# Patient Record
Sex: MALE | Race: WHITE | NOT HISPANIC OR LATINO
[De-identification: names, ages, dates, MRNs, and addresses within clinical notes are randomized per-mention and may not be internally consistent; named-entity substitution may affect disease eponyms.]

---

## 2022-01-01 ENCOUNTER — APPOINTMENT (OUTPATIENT)
Dept: UROLOGY | Facility: CLINIC | Age: 87
End: 2022-01-01

## 2022-01-20 NOTE — HISTORY OF PRESENT ILLNESS
[FreeTextEntry1] : Dear Dr. Santiago,\par \par \par Thank you so much for the referral to help care for your patient.\par \par \par Chief Complaint: Elevated PSA\par Date of first visit: 1/24/2022\par \par \par RAPHAEL JAMES  is a 89 year old ~race man with PMHx BPH s/p TURP, recurrent UTI, ED, hypogonadism, HTN, HLD, GERD who presents for elevated PSA. His PSA is >100 ng/ml. MRI on 12/14/21 demonstrated a PIRADS 5 lesion 3.8cm, bilateral mid gland involving most of the TZ and PZ with +EPE and SVI (L>R). He has never had a prostate biopsy and denies family hx of CaP.\par \par 11/17/21 Klebsiella UTI\par 12/22/21 UCx negative\par \par PSA Hx\par >100 11/17/21\par >100 10/21/1\par \par MRI Hx\par MRI at [facility] on 12/14/2021.  36cc prostate with PIRADS 5 lesion measuring 3.8cm, involving base and mid gland involving most of the transition and peripheral zones (series 8 image 8-19 on T2, series 5 image 8-16 on ADC). + EPE and SVI (L>R). No LAD, No Bony Lesions.  The images have been reviewed and clinical implications discussed with the patient.\par \par 01/24/2022\par IPSS [] QOL []\par SOURAV []\par \par  Prostate cancer screening: the patient and I spoke at length about prostate cancer screening, its risks and its benefits. The patient has 3 (age, PSA, MRI) risk factors for prostate cancer.  He understands that many men with prostate cancer will die with the disease rather than of it and we also discussed the results large multi-center American and  prostate cancer screening trials. He also understands that PSA in and of itself does not diagnose prostate cancer but only assesses risk to a certain degree. The patient understands that to definitively screen for prostate cancer, a biopsy is required and this procedure has risks, including bleeding, infection, ED and urinary retention. The patient opted to [] .\par \par The patient denies fevers, chills, nausea and or vomiting and no unexplained weight loss.\par \par All pertinent laboratory, films and physician notes were reviewed.  Questionnaire results were discussed with patient.

## 2023-01-01 ENCOUNTER — INPATIENT (INPATIENT)
Facility: HOSPITAL | Age: 88
LOS: 2 days | DRG: 193 | End: 2023-01-20
Attending: INTERNAL MEDICINE | Admitting: HOSPITALIST
Payer: MEDICARE

## 2023-01-01 VITALS
HEART RATE: 110 BPM | DIASTOLIC BLOOD PRESSURE: 62 MMHG | SYSTOLIC BLOOD PRESSURE: 103 MMHG | OXYGEN SATURATION: 95 % | WEIGHT: 190.04 LBS | RESPIRATION RATE: 18 BRPM

## 2023-01-01 VITALS — RESPIRATION RATE: 18 BRPM | OXYGEN SATURATION: 97 %

## 2023-01-01 DIAGNOSIS — R09.89 OTHER SPECIFIED SYMPTOMS AND SIGNS INVOLVING THE CIRCULATORY AND RESPIRATORY SYSTEMS: ICD-10-CM

## 2023-01-01 DIAGNOSIS — Z79.899 OTHER LONG TERM (CURRENT) DRUG THERAPY: ICD-10-CM

## 2023-01-01 DIAGNOSIS — Z51.5 ENCOUNTER FOR PALLIATIVE CARE: ICD-10-CM

## 2023-01-01 DIAGNOSIS — C34.90 MALIGNANT NEOPLASM OF UNSPECIFIED PART OF UNSPECIFIED BRONCHUS OR LUNG: ICD-10-CM

## 2023-01-01 DIAGNOSIS — I10 ESSENTIAL (PRIMARY) HYPERTENSION: ICD-10-CM

## 2023-01-01 DIAGNOSIS — R06.00 DYSPNEA, UNSPECIFIED: ICD-10-CM

## 2023-01-01 DIAGNOSIS — R52 PAIN, UNSPECIFIED: ICD-10-CM

## 2023-01-01 DIAGNOSIS — Z71.89 OTHER SPECIFIED COUNSELING: ICD-10-CM

## 2023-01-01 DIAGNOSIS — J18.9 PNEUMONIA, UNSPECIFIED ORGANISM: ICD-10-CM

## 2023-01-01 DIAGNOSIS — Z29.9 ENCOUNTER FOR PROPHYLACTIC MEASURES, UNSPECIFIED: ICD-10-CM

## 2023-01-01 DIAGNOSIS — F03.90 UNSPECIFIED DEMENTIA, UNSPECIFIED SEVERITY, WITHOUT BEHAVIORAL DISTURBANCE, PSYCHOTIC DISTURBANCE, MOOD DISTURBANCE, AND ANXIETY: ICD-10-CM

## 2023-01-01 DIAGNOSIS — J96.01 ACUTE RESPIRATORY FAILURE WITH HYPOXIA: ICD-10-CM

## 2023-01-01 LAB
ALBUMIN SERPL ELPH-MCNC: 2.9 G/DL — LOW (ref 3.3–5)
ALBUMIN SERPL ELPH-MCNC: 2.9 G/DL — LOW (ref 3.3–5)
ALBUMIN SERPL ELPH-MCNC: 3.3 G/DL — SIGNIFICANT CHANGE UP (ref 3.3–5)
ALP SERPL-CCNC: 48 U/L — SIGNIFICANT CHANGE UP (ref 40–120)
ALP SERPL-CCNC: 48 U/L — SIGNIFICANT CHANGE UP (ref 40–120)
ALP SERPL-CCNC: 54 U/L — SIGNIFICANT CHANGE UP (ref 40–120)
ALT FLD-CCNC: 8 U/L — LOW (ref 10–45)
ALT FLD-CCNC: 8 U/L — LOW (ref 10–45)
ALT FLD-CCNC: 9 U/L — LOW (ref 10–45)
ANION GAP SERPL CALC-SCNC: 12 MMOL/L — SIGNIFICANT CHANGE UP (ref 5–17)
ANION GAP SERPL CALC-SCNC: 12 MMOL/L — SIGNIFICANT CHANGE UP (ref 5–17)
ANION GAP SERPL CALC-SCNC: 13 MMOL/L — SIGNIFICANT CHANGE UP (ref 5–17)
ANION GAP SERPL CALC-SCNC: 18 MMOL/L — HIGH (ref 5–17)
APPEARANCE UR: ABNORMAL
APTT BLD: 36.5 SEC — HIGH (ref 27.5–35.5)
AST SERPL-CCNC: 12 U/L — SIGNIFICANT CHANGE UP (ref 10–40)
AST SERPL-CCNC: 9 U/L — LOW (ref 10–40)
AST SERPL-CCNC: 9 U/L — LOW (ref 10–40)
BACTERIA # UR AUTO: NEGATIVE — SIGNIFICANT CHANGE UP
BASE EXCESS BLDV CALC-SCNC: 0.8 MMOL/L — SIGNIFICANT CHANGE UP (ref -2–3)
BASOPHILS # BLD AUTO: 0.05 K/UL — SIGNIFICANT CHANGE UP (ref 0–0.2)
BASOPHILS NFR BLD AUTO: 0.7 % — SIGNIFICANT CHANGE UP (ref 0–2)
BILIRUB SERPL-MCNC: 0.2 MG/DL — SIGNIFICANT CHANGE UP (ref 0.2–1.2)
BILIRUB UR-MCNC: ABNORMAL
BUN SERPL-MCNC: 38 MG/DL — HIGH (ref 7–23)
BUN SERPL-MCNC: 38 MG/DL — HIGH (ref 7–23)
BUN SERPL-MCNC: 46 MG/DL — HIGH (ref 7–23)
BUN SERPL-MCNC: 54 MG/DL — HIGH (ref 7–23)
CA-I SERPL-SCNC: 1.35 MMOL/L — HIGH (ref 1.15–1.33)
CALCIUM SERPL-MCNC: 9.1 MG/DL — SIGNIFICANT CHANGE UP (ref 8.4–10.5)
CALCIUM SERPL-MCNC: 9.5 MG/DL — SIGNIFICANT CHANGE UP (ref 8.4–10.5)
CALCIUM SERPL-MCNC: 9.7 MG/DL — SIGNIFICANT CHANGE UP (ref 8.4–10.5)
CALCIUM SERPL-MCNC: 9.9 MG/DL — SIGNIFICANT CHANGE UP (ref 8.4–10.5)
CHLORIDE BLDV-SCNC: 107 MMOL/L — SIGNIFICANT CHANGE UP (ref 96–108)
CHLORIDE SERPL-SCNC: 107 MMOL/L — SIGNIFICANT CHANGE UP (ref 96–108)
CHLORIDE SERPL-SCNC: 108 MMOL/L — SIGNIFICANT CHANGE UP (ref 96–108)
CHLORIDE SERPL-SCNC: 109 MMOL/L — HIGH (ref 96–108)
CHLORIDE SERPL-SCNC: 111 MMOL/L — HIGH (ref 96–108)
CO2 BLDV-SCNC: 30 MMOL/L — HIGH (ref 22–26)
CO2 SERPL-SCNC: 21 MMOL/L — LOW (ref 22–31)
CO2 SERPL-SCNC: 24 MMOL/L — SIGNIFICANT CHANGE UP (ref 22–31)
CO2 SERPL-SCNC: 24 MMOL/L — SIGNIFICANT CHANGE UP (ref 22–31)
CO2 SERPL-SCNC: 25 MMOL/L — SIGNIFICANT CHANGE UP (ref 22–31)
COLOR SPEC: ABNORMAL
CREAT SERPL-MCNC: 1.59 MG/DL — HIGH (ref 0.5–1.3)
CREAT SERPL-MCNC: 1.63 MG/DL — HIGH (ref 0.5–1.3)
CREAT SERPL-MCNC: 1.76 MG/DL — HIGH (ref 0.5–1.3)
CREAT SERPL-MCNC: 1.88 MG/DL — HIGH (ref 0.5–1.3)
CULTURE RESULTS: NO GROWTH — SIGNIFICANT CHANGE UP
DIFF PNL FLD: ABNORMAL
EGFR: 32 ML/MIN/1.73M2 — LOW
EGFR: 35 ML/MIN/1.73M2 — LOW
EGFR: 39 ML/MIN/1.73M2 — LOW
EGFR: 40 ML/MIN/1.73M2 — LOW
EOSINOPHIL # BLD AUTO: 0.08 K/UL — SIGNIFICANT CHANGE UP (ref 0–0.5)
EOSINOPHIL NFR BLD AUTO: 1.1 % — SIGNIFICANT CHANGE UP (ref 0–6)
EPI CELLS # UR: 6 /HPF — HIGH
GAS PNL BLDV: 142 MMOL/L — SIGNIFICANT CHANGE UP (ref 136–145)
GAS PNL BLDV: SIGNIFICANT CHANGE UP
GLUCOSE BLDV-MCNC: 124 MG/DL — HIGH (ref 70–99)
GLUCOSE SERPL-MCNC: 119 MG/DL — HIGH (ref 70–99)
GLUCOSE SERPL-MCNC: 125 MG/DL — HIGH (ref 70–99)
GLUCOSE SERPL-MCNC: 130 MG/DL — HIGH (ref 70–99)
GLUCOSE SERPL-MCNC: 70 MG/DL — SIGNIFICANT CHANGE UP (ref 70–99)
GLUCOSE UR QL: NEGATIVE — SIGNIFICANT CHANGE UP
HCO3 BLDV-SCNC: 28 MMOL/L — SIGNIFICANT CHANGE UP (ref 22–29)
HCT VFR BLD CALC: 29.1 % — LOW (ref 39–50)
HCT VFR BLD CALC: 29.1 % — LOW (ref 39–50)
HCT VFR BLD CALC: 30.7 % — LOW (ref 39–50)
HCT VFR BLD CALC: 31.1 % — LOW (ref 39–50)
HCT VFR BLDA CALC: 28 % — LOW (ref 39–51)
HGB BLD CALC-MCNC: 9.3 G/DL — LOW (ref 12.6–17.4)
HGB BLD-MCNC: 8 G/DL — LOW (ref 13–17)
HGB BLD-MCNC: 8.2 G/DL — LOW (ref 13–17)
HGB BLD-MCNC: 8.7 G/DL — LOW (ref 13–17)
HGB BLD-MCNC: 8.7 G/DL — LOW (ref 13–17)
HYALINE CASTS # UR AUTO: ABNORMAL /LPF
IMM GRANULOCYTES NFR BLD AUTO: 0.8 % — SIGNIFICANT CHANGE UP (ref 0–0.9)
INR BLD: 2.6 RATIO — HIGH (ref 0.88–1.16)
KETONES UR-MCNC: NEGATIVE — SIGNIFICANT CHANGE UP
LACTATE BLDV-MCNC: 1.1 MMOL/L — SIGNIFICANT CHANGE UP (ref 0.5–2)
LEUKOCYTE ESTERASE UR-ACNC: NEGATIVE — SIGNIFICANT CHANGE UP
LYMPHOCYTES # BLD AUTO: 1.09 K/UL — SIGNIFICANT CHANGE UP (ref 1–3.3)
LYMPHOCYTES # BLD AUTO: 14.8 % — SIGNIFICANT CHANGE UP (ref 13–44)
MCHC RBC-ENTMCNC: 27 PG — SIGNIFICANT CHANGE UP (ref 27–34)
MCHC RBC-ENTMCNC: 27.4 PG — SIGNIFICANT CHANGE UP (ref 27–34)
MCHC RBC-ENTMCNC: 27.5 GM/DL — LOW (ref 32–36)
MCHC RBC-ENTMCNC: 28 GM/DL — LOW (ref 32–36)
MCHC RBC-ENTMCNC: 28.2 GM/DL — LOW (ref 32–36)
MCHC RBC-ENTMCNC: 28.3 GM/DL — LOW (ref 32–36)
MCV RBC AUTO: 96.8 FL — SIGNIFICANT CHANGE UP (ref 80–100)
MCV RBC AUTO: 97.3 FL — SIGNIFICANT CHANGE UP (ref 80–100)
MCV RBC AUTO: 97.8 FL — SIGNIFICANT CHANGE UP (ref 80–100)
MCV RBC AUTO: 98.3 FL — SIGNIFICANT CHANGE UP (ref 80–100)
MONOCYTES # BLD AUTO: 0.68 K/UL — SIGNIFICANT CHANGE UP (ref 0–0.9)
MONOCYTES NFR BLD AUTO: 9.2 % — SIGNIFICANT CHANGE UP (ref 2–14)
MRSA PCR RESULT.: SIGNIFICANT CHANGE UP
NEUTROPHILS # BLD AUTO: 5.41 K/UL — SIGNIFICANT CHANGE UP (ref 1.8–7.4)
NEUTROPHILS NFR BLD AUTO: 73.4 % — SIGNIFICANT CHANGE UP (ref 43–77)
NITRITE UR-MCNC: NEGATIVE — SIGNIFICANT CHANGE UP
NRBC # BLD: 0 /100 WBCS — SIGNIFICANT CHANGE UP (ref 0–0)
PCO2 BLDV: 60 MMHG — HIGH (ref 42–55)
PH BLDV: 7.28 — LOW (ref 7.32–7.43)
PH UR: 5.5 — SIGNIFICANT CHANGE UP (ref 5–8)
PLATELET # BLD AUTO: 229 K/UL — SIGNIFICANT CHANGE UP (ref 150–400)
PLATELET # BLD AUTO: 247 K/UL — SIGNIFICANT CHANGE UP (ref 150–400)
PLATELET # BLD AUTO: 279 K/UL — SIGNIFICANT CHANGE UP (ref 150–400)
PLATELET # BLD AUTO: 288 K/UL — SIGNIFICANT CHANGE UP (ref 150–400)
PO2 BLDV: 27 MMHG — SIGNIFICANT CHANGE UP (ref 25–45)
POTASSIUM BLDV-SCNC: 5.4 MMOL/L — HIGH (ref 3.5–5.1)
POTASSIUM SERPL-MCNC: 5 MMOL/L — SIGNIFICANT CHANGE UP (ref 3.5–5.3)
POTASSIUM SERPL-MCNC: 5 MMOL/L — SIGNIFICANT CHANGE UP (ref 3.5–5.3)
POTASSIUM SERPL-MCNC: 5.1 MMOL/L — SIGNIFICANT CHANGE UP (ref 3.5–5.3)
POTASSIUM SERPL-MCNC: 5.4 MMOL/L — HIGH (ref 3.5–5.3)
POTASSIUM SERPL-SCNC: 5 MMOL/L — SIGNIFICANT CHANGE UP (ref 3.5–5.3)
POTASSIUM SERPL-SCNC: 5 MMOL/L — SIGNIFICANT CHANGE UP (ref 3.5–5.3)
POTASSIUM SERPL-SCNC: 5.1 MMOL/L — SIGNIFICANT CHANGE UP (ref 3.5–5.3)
POTASSIUM SERPL-SCNC: 5.4 MMOL/L — HIGH (ref 3.5–5.3)
PROT SERPL-MCNC: 6.1 G/DL — SIGNIFICANT CHANGE UP (ref 6–8.3)
PROT SERPL-MCNC: 6.3 G/DL — SIGNIFICANT CHANGE UP (ref 6–8.3)
PROT SERPL-MCNC: 6.6 G/DL — SIGNIFICANT CHANGE UP (ref 6–8.3)
PROT UR-MCNC: ABNORMAL
PROTHROM AB SERPL-ACNC: 30.2 SEC — HIGH (ref 10.5–13.4)
RAPID RVP RESULT: SIGNIFICANT CHANGE UP
RBC # BLD: 2.96 M/UL — LOW (ref 4.2–5.8)
RBC # BLD: 2.99 M/UL — LOW (ref 4.2–5.8)
RBC # BLD: 3.17 M/UL — LOW (ref 4.2–5.8)
RBC # BLD: 3.18 M/UL — LOW (ref 4.2–5.8)
RBC # FLD: 14.2 % — SIGNIFICANT CHANGE UP (ref 10.3–14.5)
RBC # FLD: 14.3 % — SIGNIFICANT CHANGE UP (ref 10.3–14.5)
RBC # FLD: 14.4 % — SIGNIFICANT CHANGE UP (ref 10.3–14.5)
RBC # FLD: 14.4 % — SIGNIFICANT CHANGE UP (ref 10.3–14.5)
RBC CASTS # UR COMP ASSIST: 8 /HPF — HIGH (ref 0–4)
S AUREUS DNA NOSE QL NAA+PROBE: SIGNIFICANT CHANGE UP
SAO2 % BLDV: 36 % — LOW (ref 67–88)
SARS-COV-2 RNA SPEC QL NAA+PROBE: SIGNIFICANT CHANGE UP
SODIUM SERPL-SCNC: 143 MMOL/L — SIGNIFICANT CHANGE UP (ref 135–145)
SODIUM SERPL-SCNC: 145 MMOL/L — SIGNIFICANT CHANGE UP (ref 135–145)
SODIUM SERPL-SCNC: 147 MMOL/L — HIGH (ref 135–145)
SODIUM SERPL-SCNC: 149 MMOL/L — HIGH (ref 135–145)
SP GR SPEC: 1.02 — SIGNIFICANT CHANGE UP (ref 1.01–1.02)
SPECIMEN SOURCE: SIGNIFICANT CHANGE UP
TSH SERPL-MCNC: 2.03 UIU/ML — SIGNIFICANT CHANGE UP (ref 0.27–4.2)
UROBILINOGEN FLD QL: ABNORMAL
VIT B12 SERPL-MCNC: 750 PG/ML — SIGNIFICANT CHANGE UP (ref 232–1245)
WBC # BLD: 7.37 K/UL — SIGNIFICANT CHANGE UP (ref 3.8–10.5)
WBC # BLD: 7.47 K/UL — SIGNIFICANT CHANGE UP (ref 3.8–10.5)
WBC # BLD: 8.57 K/UL — SIGNIFICANT CHANGE UP (ref 3.8–10.5)
WBC # BLD: 9.36 K/UL — SIGNIFICANT CHANGE UP (ref 3.8–10.5)
WBC # FLD AUTO: 7.37 K/UL — SIGNIFICANT CHANGE UP (ref 3.8–10.5)
WBC # FLD AUTO: 7.47 K/UL — SIGNIFICANT CHANGE UP (ref 3.8–10.5)
WBC # FLD AUTO: 8.57 K/UL — SIGNIFICANT CHANGE UP (ref 3.8–10.5)
WBC # FLD AUTO: 9.36 K/UL — SIGNIFICANT CHANGE UP (ref 3.8–10.5)
WBC UR QL: 6 /HPF — HIGH (ref 0–5)

## 2023-01-01 PROCEDURE — 80048 BASIC METABOLIC PNL TOTAL CA: CPT

## 2023-01-01 PROCEDURE — 71250 CT THORAX DX C-: CPT | Mod: 26

## 2023-01-01 PROCEDURE — 82947 ASSAY GLUCOSE BLOOD QUANT: CPT

## 2023-01-01 PROCEDURE — 87040 BLOOD CULTURE FOR BACTERIA: CPT

## 2023-01-01 PROCEDURE — 82607 VITAMIN B-12: CPT

## 2023-01-01 PROCEDURE — 82435 ASSAY OF BLOOD CHLORIDE: CPT

## 2023-01-01 PROCEDURE — 80053 COMPREHEN METABOLIC PANEL: CPT

## 2023-01-01 PROCEDURE — 84443 ASSAY THYROID STIM HORMONE: CPT

## 2023-01-01 PROCEDURE — 99222 1ST HOSP IP/OBS MODERATE 55: CPT

## 2023-01-01 PROCEDURE — 87641 MR-STAPH DNA AMP PROBE: CPT

## 2023-01-01 PROCEDURE — 82330 ASSAY OF CALCIUM: CPT

## 2023-01-01 PROCEDURE — 85018 HEMOGLOBIN: CPT

## 2023-01-01 PROCEDURE — 71045 X-RAY EXAM CHEST 1 VIEW: CPT | Mod: 26

## 2023-01-01 PROCEDURE — 85027 COMPLETE CBC AUTOMATED: CPT

## 2023-01-01 PROCEDURE — 96374 THER/PROPH/DIAG INJ IV PUSH: CPT

## 2023-01-01 PROCEDURE — 83605 ASSAY OF LACTIC ACID: CPT

## 2023-01-01 PROCEDURE — 87086 URINE CULTURE/COLONY COUNT: CPT

## 2023-01-01 PROCEDURE — 99285 EMERGENCY DEPT VISIT HI MDM: CPT

## 2023-01-01 PROCEDURE — 81001 URINALYSIS AUTO W/SCOPE: CPT

## 2023-01-01 PROCEDURE — 36415 COLL VENOUS BLD VENIPUNCTURE: CPT

## 2023-01-01 PROCEDURE — 85014 HEMATOCRIT: CPT

## 2023-01-01 PROCEDURE — 0225U NFCT DS DNA&RNA 21 SARSCOV2: CPT

## 2023-01-01 PROCEDURE — 99285 EMERGENCY DEPT VISIT HI MDM: CPT | Mod: 25

## 2023-01-01 PROCEDURE — 99223 1ST HOSP IP/OBS HIGH 75: CPT

## 2023-01-01 PROCEDURE — 84145 PROCALCITONIN (PCT): CPT

## 2023-01-01 PROCEDURE — 85025 COMPLETE CBC W/AUTO DIFF WBC: CPT

## 2023-01-01 PROCEDURE — 71045 X-RAY EXAM CHEST 1 VIEW: CPT

## 2023-01-01 PROCEDURE — 84295 ASSAY OF SERUM SODIUM: CPT

## 2023-01-01 PROCEDURE — 87640 STAPH A DNA AMP PROBE: CPT

## 2023-01-01 PROCEDURE — 85730 THROMBOPLASTIN TIME PARTIAL: CPT

## 2023-01-01 PROCEDURE — 82803 BLOOD GASES ANY COMBINATION: CPT

## 2023-01-01 PROCEDURE — 85610 PROTHROMBIN TIME: CPT

## 2023-01-01 PROCEDURE — 84132 ASSAY OF SERUM POTASSIUM: CPT

## 2023-01-01 PROCEDURE — 71250 CT THORAX DX C-: CPT | Mod: MA

## 2023-01-01 PROCEDURE — 99233 SBSQ HOSP IP/OBS HIGH 50: CPT | Mod: GC

## 2023-01-01 PROCEDURE — 94660 CPAP INITIATION&MGMT: CPT

## 2023-01-01 RX ORDER — CHLORHEXIDINE GLUCONATE 213 G/1000ML
1 SOLUTION TOPICAL DAILY
Refills: 0 | Status: DISCONTINUED | OUTPATIENT
Start: 2023-01-01 | End: 2023-01-01

## 2023-01-01 RX ORDER — OMEPRAZOLE 10 MG/1
1 CAPSULE, DELAYED RELEASE ORAL
Qty: 0 | Refills: 0 | DISCHARGE

## 2023-01-01 RX ORDER — HYDROMORPHONE HYDROCHLORIDE 2 MG/ML
0.5 INJECTION INTRAMUSCULAR; INTRAVENOUS; SUBCUTANEOUS
Refills: 0 | Status: DISCONTINUED | OUTPATIENT
Start: 2023-01-01 | End: 2023-01-01

## 2023-01-01 RX ORDER — MIRTAZAPINE 45 MG/1
1 TABLET, ORALLY DISINTEGRATING ORAL
Qty: 0 | Refills: 0 | DISCHARGE

## 2023-01-01 RX ORDER — LANOLIN ALCOHOL/MO/W.PET/CERES
3 CREAM (GRAM) TOPICAL AT BEDTIME
Refills: 0 | Status: DISCONTINUED | OUTPATIENT
Start: 2023-01-01 | End: 2023-01-01

## 2023-01-01 RX ORDER — ACETAMINOPHEN 500 MG
650 TABLET ORAL EVERY 6 HOURS
Refills: 0 | Status: DISCONTINUED | OUTPATIENT
Start: 2023-01-01 | End: 2023-01-01

## 2023-01-01 RX ORDER — LANOLIN ALCOHOL/MO/W.PET/CERES
5 CREAM (GRAM) TOPICAL ONCE
Refills: 0 | Status: COMPLETED | OUTPATIENT
Start: 2023-01-01 | End: 2023-01-01

## 2023-01-01 RX ORDER — SIMVASTATIN 20 MG/1
0 TABLET, FILM COATED ORAL
Qty: 0 | Refills: 0 | DISCHARGE

## 2023-01-01 RX ORDER — HYDROMORPHONE HYDROCHLORIDE 2 MG/ML
0.2 INJECTION INTRAMUSCULAR; INTRAVENOUS; SUBCUTANEOUS
Refills: 0 | Status: DISCONTINUED | OUTPATIENT
Start: 2023-01-01 | End: 2023-01-01

## 2023-01-01 RX ORDER — PIPERACILLIN AND TAZOBACTAM 4; .5 G/20ML; G/20ML
3.38 INJECTION, POWDER, LYOPHILIZED, FOR SOLUTION INTRAVENOUS ONCE
Refills: 0 | Status: COMPLETED | OUTPATIENT
Start: 2023-01-01 | End: 2023-01-01

## 2023-01-01 RX ORDER — SODIUM CHLORIDE 9 MG/ML
1000 INJECTION INTRAMUSCULAR; INTRAVENOUS; SUBCUTANEOUS ONCE
Refills: 0 | Status: COMPLETED | OUTPATIENT
Start: 2023-01-01 | End: 2023-01-01

## 2023-01-01 RX ORDER — APIXABAN 2.5 MG/1
1 TABLET, FILM COATED ORAL
Qty: 0 | Refills: 0 | DISCHARGE

## 2023-01-01 RX ORDER — HYDRALAZINE HCL 50 MG
1 TABLET ORAL
Qty: 0 | Refills: 0 | DISCHARGE

## 2023-01-01 RX ORDER — SODIUM CHLORIDE 9 MG/ML
1000 INJECTION, SOLUTION INTRAVENOUS
Refills: 0 | Status: DISCONTINUED | OUTPATIENT
Start: 2023-01-01 | End: 2023-01-01

## 2023-01-01 RX ORDER — SODIUM CHLORIDE 9 MG/ML
1000 INJECTION INTRAMUSCULAR; INTRAVENOUS; SUBCUTANEOUS
Refills: 0 | Status: DISCONTINUED | OUTPATIENT
Start: 2023-01-01 | End: 2023-01-01

## 2023-01-01 RX ORDER — HYDROMORPHONE HYDROCHLORIDE 2 MG/ML
0.5 INJECTION INTRAMUSCULAR; INTRAVENOUS; SUBCUTANEOUS EVERY 6 HOURS
Refills: 0 | Status: DISCONTINUED | OUTPATIENT
Start: 2023-01-01 | End: 2023-01-01

## 2023-01-01 RX ORDER — PIPERACILLIN AND TAZOBACTAM 4; .5 G/20ML; G/20ML
3.38 INJECTION, POWDER, LYOPHILIZED, FOR SOLUTION INTRAVENOUS EVERY 8 HOURS
Refills: 0 | Status: DISCONTINUED | OUTPATIENT
Start: 2023-01-01 | End: 2023-01-01

## 2023-01-01 RX ORDER — ONDANSETRON 8 MG/1
4 TABLET, FILM COATED ORAL EVERY 8 HOURS
Refills: 0 | Status: DISCONTINUED | OUTPATIENT
Start: 2023-01-01 | End: 2023-01-01

## 2023-01-01 RX ORDER — HYDROMORPHONE HYDROCHLORIDE 2 MG/ML
0.3 INJECTION INTRAMUSCULAR; INTRAVENOUS; SUBCUTANEOUS
Refills: 0 | Status: DISCONTINUED | OUTPATIENT
Start: 2023-01-01 | End: 2023-01-01

## 2023-01-01 RX ORDER — CEFTRIAXONE 500 MG/1
1000 INJECTION, POWDER, FOR SOLUTION INTRAMUSCULAR; INTRAVENOUS ONCE
Refills: 0 | Status: COMPLETED | OUTPATIENT
Start: 2023-01-01 | End: 2023-01-01

## 2023-01-01 RX ORDER — LABETALOL HCL 100 MG
0 TABLET ORAL
Qty: 0 | Refills: 0 | DISCHARGE

## 2023-01-01 RX ORDER — ENOXAPARIN SODIUM 100 MG/ML
40 INJECTION SUBCUTANEOUS EVERY 24 HOURS
Refills: 0 | Status: DISCONTINUED | OUTPATIENT
Start: 2023-01-01 | End: 2023-01-01

## 2023-01-01 RX ORDER — ROBINUL 0.2 MG/ML
0.4 INJECTION INTRAMUSCULAR; INTRAVENOUS EVERY 6 HOURS
Refills: 0 | Status: DISCONTINUED | OUTPATIENT
Start: 2023-01-01 | End: 2023-01-01

## 2023-01-01 RX ORDER — POLYETHYLENE GLYCOL 3350 17 G/17G
17 POWDER, FOR SOLUTION ORAL EVERY 12 HOURS
Refills: 0 | Status: DISCONTINUED | OUTPATIENT
Start: 2023-01-01 | End: 2023-01-01

## 2023-01-01 RX ORDER — FUROSEMIDE 40 MG
1 TABLET ORAL
Qty: 0 | Refills: 0 | DISCHARGE

## 2023-01-01 RX ORDER — LABETALOL HCL 100 MG
200 TABLET ORAL
Refills: 0 | Status: DISCONTINUED | OUTPATIENT
Start: 2023-01-01 | End: 2023-01-01

## 2023-01-01 RX ORDER — LABETALOL HCL 100 MG
2 TABLET ORAL
Qty: 0 | Refills: 0 | DISCHARGE

## 2023-01-01 RX ORDER — ACETAMINOPHEN 500 MG
650 TABLET ORAL ONCE
Refills: 0 | Status: COMPLETED | OUTPATIENT
Start: 2023-01-01 | End: 2023-01-01

## 2023-01-01 RX ADMIN — Medication 650 MILLIGRAM(S): at 17:21

## 2023-01-01 RX ADMIN — ENOXAPARIN SODIUM 40 MILLIGRAM(S): 100 INJECTION SUBCUTANEOUS at 11:50

## 2023-01-01 RX ADMIN — Medication 0.5 MILLIGRAM(S): at 15:16

## 2023-01-01 RX ADMIN — PIPERACILLIN AND TAZOBACTAM 25 GRAM(S): 4; .5 INJECTION, POWDER, LYOPHILIZED, FOR SOLUTION INTRAVENOUS at 14:03

## 2023-01-01 RX ADMIN — CEFTRIAXONE 100 MILLIGRAM(S): 500 INJECTION, POWDER, FOR SOLUTION INTRAMUSCULAR; INTRAVENOUS at 02:29

## 2023-01-01 RX ADMIN — PIPERACILLIN AND TAZOBACTAM 25 GRAM(S): 4; .5 INJECTION, POWDER, LYOPHILIZED, FOR SOLUTION INTRAVENOUS at 14:12

## 2023-01-01 RX ADMIN — PIPERACILLIN AND TAZOBACTAM 200 GRAM(S): 4; .5 INJECTION, POWDER, LYOPHILIZED, FOR SOLUTION INTRAVENOUS at 05:10

## 2023-01-01 RX ADMIN — HYDROMORPHONE HYDROCHLORIDE 0.5 MILLIGRAM(S): 2 INJECTION INTRAMUSCULAR; INTRAVENOUS; SUBCUTANEOUS at 12:14

## 2023-01-01 RX ADMIN — Medication 650 MILLIGRAM(S): at 01:39

## 2023-01-01 RX ADMIN — Medication 100 MILLIGRAM(S): at 03:46

## 2023-01-01 RX ADMIN — SODIUM CHLORIDE 50 MILLILITER(S): 9 INJECTION, SOLUTION INTRAVENOUS at 04:53

## 2023-01-01 RX ADMIN — SODIUM CHLORIDE 1000 MILLILITER(S): 9 INJECTION INTRAMUSCULAR; INTRAVENOUS; SUBCUTANEOUS at 01:33

## 2023-01-01 RX ADMIN — Medication 5 MILLIGRAM(S): at 22:21

## 2023-01-01 RX ADMIN — Medication 200 MILLIGRAM(S): at 18:17

## 2023-01-01 RX ADMIN — HYDROMORPHONE HYDROCHLORIDE 0.3 MILLIGRAM(S): 2 INJECTION INTRAMUSCULAR; INTRAVENOUS; SUBCUTANEOUS at 07:25

## 2023-01-01 RX ADMIN — HYDROMORPHONE HYDROCHLORIDE 0.3 MILLIGRAM(S): 2 INJECTION INTRAMUSCULAR; INTRAVENOUS; SUBCUTANEOUS at 07:40

## 2023-01-01 RX ADMIN — SODIUM CHLORIDE 10 MILLILITER(S): 9 INJECTION INTRAMUSCULAR; INTRAVENOUS; SUBCUTANEOUS at 13:03

## 2023-01-01 RX ADMIN — HYDROMORPHONE HYDROCHLORIDE 0.3 MILLIGRAM(S): 2 INJECTION INTRAMUSCULAR; INTRAVENOUS; SUBCUTANEOUS at 00:30

## 2023-01-01 RX ADMIN — HYDROMORPHONE HYDROCHLORIDE 0.3 MILLIGRAM(S): 2 INJECTION INTRAMUSCULAR; INTRAVENOUS; SUBCUTANEOUS at 15:16

## 2023-01-01 RX ADMIN — Medication 200 MILLIGRAM(S): at 04:56

## 2023-01-01 RX ADMIN — PIPERACILLIN AND TAZOBACTAM 25 GRAM(S): 4; .5 INJECTION, POWDER, LYOPHILIZED, FOR SOLUTION INTRAVENOUS at 14:45

## 2023-01-01 RX ADMIN — SODIUM CHLORIDE 50 MILLILITER(S): 9 INJECTION, SOLUTION INTRAVENOUS at 07:27

## 2023-01-01 RX ADMIN — CHLORHEXIDINE GLUCONATE 1 APPLICATION(S): 213 SOLUTION TOPICAL at 11:39

## 2023-01-01 RX ADMIN — PIPERACILLIN AND TAZOBACTAM 25 GRAM(S): 4; .5 INJECTION, POWDER, LYOPHILIZED, FOR SOLUTION INTRAVENOUS at 21:16

## 2023-01-01 RX ADMIN — Medication 100 MILLIGRAM(S): at 23:40

## 2023-01-01 RX ADMIN — ENOXAPARIN SODIUM 40 MILLIGRAM(S): 100 INJECTION SUBCUTANEOUS at 10:18

## 2023-01-01 RX ADMIN — PIPERACILLIN AND TAZOBACTAM 25 GRAM(S): 4; .5 INJECTION, POWDER, LYOPHILIZED, FOR SOLUTION INTRAVENOUS at 08:48

## 2023-01-01 RX ADMIN — SODIUM CHLORIDE 1000 MILLILITER(S): 9 INJECTION INTRAMUSCULAR; INTRAVENOUS; SUBCUTANEOUS at 10:15

## 2023-01-01 RX ADMIN — CHLORHEXIDINE GLUCONATE 1 APPLICATION(S): 213 SOLUTION TOPICAL at 11:52

## 2023-01-01 RX ADMIN — SODIUM CHLORIDE 50 MILLILITER(S): 9 INJECTION, SOLUTION INTRAVENOUS at 18:17

## 2023-01-01 RX ADMIN — Medication 650 MILLIGRAM(S): at 09:52

## 2023-01-01 RX ADMIN — HYDROMORPHONE HYDROCHLORIDE 0.3 MILLIGRAM(S): 2 INJECTION INTRAMUSCULAR; INTRAVENOUS; SUBCUTANEOUS at 00:13

## 2023-01-01 RX ADMIN — PIPERACILLIN AND TAZOBACTAM 25 GRAM(S): 4; .5 INJECTION, POWDER, LYOPHILIZED, FOR SOLUTION INTRAVENOUS at 10:00

## 2023-01-01 RX ADMIN — Medication 100 MILLIGRAM(S): at 08:49

## 2023-01-01 RX ADMIN — HYDROMORPHONE HYDROCHLORIDE 0.3 MILLIGRAM(S): 2 INJECTION INTRAMUSCULAR; INTRAVENOUS; SUBCUTANEOUS at 15:31

## 2023-01-01 RX ADMIN — PIPERACILLIN AND TAZOBACTAM 25 GRAM(S): 4; .5 INJECTION, POWDER, LYOPHILIZED, FOR SOLUTION INTRAVENOUS at 02:00

## 2023-01-01 RX ADMIN — HYDROMORPHONE HYDROCHLORIDE 0.3 MILLIGRAM(S): 2 INJECTION INTRAMUSCULAR; INTRAVENOUS; SUBCUTANEOUS at 04:33

## 2023-01-01 RX ADMIN — ROBINUL 0.4 MILLIGRAM(S): 0.2 INJECTION INTRAMUSCULAR; INTRAVENOUS at 23:33

## 2023-01-01 RX ADMIN — HYDROMORPHONE HYDROCHLORIDE 0.5 MILLIGRAM(S): 2 INJECTION INTRAMUSCULAR; INTRAVENOUS; SUBCUTANEOUS at 11:58

## 2023-01-01 RX ADMIN — Medication 0.5 MILLIGRAM(S): at 21:16

## 2023-01-01 RX ADMIN — PIPERACILLIN AND TAZOBACTAM 25 GRAM(S): 4; .5 INJECTION, POWDER, LYOPHILIZED, FOR SOLUTION INTRAVENOUS at 17:52

## 2023-01-01 RX ADMIN — PIPERACILLIN AND TAZOBACTAM 25 GRAM(S): 4; .5 INJECTION, POWDER, LYOPHILIZED, FOR SOLUTION INTRAVENOUS at 21:40

## 2023-01-01 RX ADMIN — PIPERACILLIN AND TAZOBACTAM 25 GRAM(S): 4; .5 INJECTION, POWDER, LYOPHILIZED, FOR SOLUTION INTRAVENOUS at 04:54

## 2023-01-01 RX ADMIN — Medication 650 MILLIGRAM(S): at 16:36

## 2023-01-01 RX ADMIN — Medication 650 MILLIGRAM(S): at 23:21

## 2023-01-01 RX ADMIN — Medication 650 MILLIGRAM(S): at 22:21

## 2023-01-01 RX ADMIN — PIPERACILLIN AND TAZOBACTAM 25 GRAM(S): 4; .5 INJECTION, POWDER, LYOPHILIZED, FOR SOLUTION INTRAVENOUS at 05:21

## 2023-01-01 RX ADMIN — SODIUM CHLORIDE 50 MILLILITER(S): 9 INJECTION, SOLUTION INTRAVENOUS at 14:38

## 2023-01-01 RX ADMIN — HYDROMORPHONE HYDROCHLORIDE 0.3 MILLIGRAM(S): 2 INJECTION INTRAMUSCULAR; INTRAVENOUS; SUBCUTANEOUS at 04:18

## 2023-01-01 RX ADMIN — Medication 3 MILLIGRAM(S): at 02:00

## 2023-01-01 RX ADMIN — ROBINUL 0.4 MILLIGRAM(S): 0.2 INJECTION INTRAMUSCULAR; INTRAVENOUS at 15:16

## 2023-01-01 RX ADMIN — CHLORHEXIDINE GLUCONATE 1 APPLICATION(S): 213 SOLUTION TOPICAL at 12:00

## 2023-01-17 NOTE — H&P ADULT - HISTORY OF PRESENT ILLNESS
Patient is a 89 year old male w/pmh dementia , advanced lung cancer , presents for increased dyspnea Patient provides limited history , family and caretaker unavailable at time of interview and unable to be reached by phone.  Patient is a 89 year old male w/pmh dementia , HTN , advanced lung cancer , presents for increased shortness of breath and work of breathing. Per ED provider , patient with intermittent cough ,  labored breathing including retractions on presentation , improved with bipap.

## 2023-01-17 NOTE — ED ADULT NURSE REASSESSMENT NOTE - NS ED NURSE REASSESS COMMENT FT1
ZENOBIA jefferson contacted about Khris Hazel calling the ED for information and medication list for admitting team. As per li I teamed her a message with his number to let her know.

## 2023-01-17 NOTE — ED PROVIDER NOTE - ATTENDING CONTRIBUTION TO CARE
MD Salcido:  patient seen and evaluated with the resident.  I was present for key portions of the History & Physical, and I agree with the Impression & Plan.  MD Salcido: 89-year-old male, brought in by EMS for evaluation of difficulty breathing.  Duration: Unknown  Associated symptoms: Denies fever or chills, no chest pain.  1 episode of hematemesis today.    Context: Known lung cancer, unknown status.  Unknown if anticoagulated at this time.  Vital signs: Tachycardia appreciated, normotensive, 95% on 5 L, afebrile.  Physical exam: Elderly male, significant work of breathing appreciated.  Poor air movement bilaterally; diminished breath sounds: Left worse than right  No JVD.  No lower extremity edema  Cardiac: Tachycardia appreciated  Impression/Plan: DDx at this time includes pleural effusions versus pulmonary infection, differential diagnosis at this time includes pulmonary embolism, warrants aggressive workup in the ED with labs, CXR, supplemental O2. MD Salcido:  patient seen and evaluated with the resident.  I was present for key portions of the History & Physical, and I agree with the Impression & Plan.  MD Salcido: 89-year-old male, brought in by EMS for evaluation of difficulty breathing.  Duration: Unknown  Associated symptoms: Denies fever or chills, no chest pain.  1 episode of hematemesis today.    Context: Known lung cancer, unknown status.  Unknown if anticoagulated at this time.  Vital signs: Tachycardia appreciated, normotensive, 95% on 5 L, afebrile.  Physical exam: Elderly male, significant work of breathing appreciated.  Poor air movement bilaterally; diminished breath sounds: Left worse than right  No JVD.  No lower extremity edema  Cardiac: Tachycardia appreciated  Impression/Plan: DDx at this time includes pleural effusions versus pulmonary infection, differential diagnosis at this time includes pulmonary embolism, warrants aggressive workup in the ED with labs, CXR, supplemental O2.  ECG reviewed, and prior ECG showed a LBBB.

## 2023-01-17 NOTE — CONSULT NOTE ADULT - ASSESSMENT
89 year old male w/pmh dementia , HTN , advanced lung cancer , presents for increased shortness of breath and work of breathing.      Hyoxic resp failure   ? Ppst obstructive pneumonia:   Lung cancer: ? stage   htn:  dementia:     1/17/2023:      Hyoxic resp failure / Hemoptysis: pt is on eliquis:  stop for now:  ct chest reviewed likely has post obstructive pneumonia:  he could be bleeding because of that : He is already on anabiotics :  ? bronchoscopy:  however don't know how aggressive pts family want to be: the son is on vacation:  quantitate hemoptysis  thoracic surgery : He also seems to ahve ac on chronic hypercapnic resp filaure:  will bneed bipa prn in daytime and full time at night time:   ? Ppst obstructive pneumonia:  check all cultures:    Lung cancer: ? stage  : need to have more information:  unable t o contact son   htn: controlled  dementia: supportive care: :  dw acp : reportedly : son wants palliative care:  per acp 
Patient is a 89 year old male w/pmh dementia , HTN , advanced lung cancer , presents for increased shortness of breath and work of breathing. Per ED provider , patient with intermittent cough ,  labored breathing including retractions on presentation , improved with bipap.  Being treated empirically for post obstructive PNA.  Asked to evaluate for PCU admission.   Unable to reach son, left a message.      As per ED Documentation -  Valdemar Stephens PGY-3 spoke with son, currently in [place]. Unaware of patient being in ER   Lives with    [place] - [phone]  Marge Mercad[place] - [phone]  All care at [facility]. End stage lung ca as per son. Son is healthcare proxy, state patient is dnr/dni. Would like natural death.

## 2023-01-17 NOTE — H&P ADULT - ASSESSMENT
89 year old male w/pmh dementia , HTN , advanced lung cancer , presents for increased shortness of breath and work of breathing.

## 2023-01-17 NOTE — PATIENT PROFILE ADULT - FALL HARM RISK - HARM RISK INTERVENTIONS

## 2023-01-17 NOTE — ED ADULT NURSE REASSESSMENT NOTE - NS ED NURSE REASSESS COMMENT FT1
Pt straight cathed using sterile technique. Pt tolerated procedure well. approx 100mL dark yellow urine noted in collection bag, sterile specimen collected. UA and Culture sent per orders. pt cleaned and repositioned, fresh new linens provided. pt resting in stretcher with side rails up for safety, VSS, appears comfortable. NAD noted at this time.

## 2023-01-17 NOTE — H&P ADULT - NSHPPHYSICALEXAM_GEN_ALL_CORE
Vital Signs Last 24 Hrs  T(C): 37.7 (17 Jan 2023 01:08), Max: 37.7 (17 Jan 2023 01:08)  T(F): 99.8 (17 Jan 2023 01:08), Max: 99.8 (17 Jan 2023 01:08)  HR: 90 (17 Jan 2023 03:09) (79 - 122)  BP: 112/71 (17 Jan 2023 03:09) (86/64 - 140/62)  BP(mean): 79 (17 Jan 2023 03:09) (70 - 92)  RR: 16 (17 Jan 2023 03:09) (16 - 24)  SpO2: 100% (17 Jan 2023 03:09) (95% - 100%)    Parameters below as of 17 Jan 2023 03:09  Patient On (Oxygen Delivery Method): nasal cannula  O2 Flow (L/min): 2 Vital Signs Last 24 Hrs  T(C): 37.7 (17 Jan 2023 01:08), Max: 37.7 (17 Jan 2023 01:08)  T(F): 99.8 (17 Jan 2023 01:08), Max: 99.8 (17 Jan 2023 01:08)  HR: 90 (17 Jan 2023 03:09) (79 - 122)  BP: 112/71 (17 Jan 2023 03:09) (86/64 - 140/62)  BP(mean): 79 (17 Jan 2023 03:09) (70 - 92)  RR: 16 (17 Jan 2023 03:09) (16 - 24)  SpO2: 100% (17 Jan 2023 03:09) (95% - 100%)    Parameters below as of 17 Jan 2023 03:09  Patient On (Oxygen Delivery Method): nasal cannula  O2 Flow (L/min): 2    GENERAL: alert , opens eyes to verbal stimuli , breathing mildly labored , occasional wet sounding cough   HEAD:  Atraumatic, Normocephalic  ENT: EOMI, PERRLA, conjunctiva and sclera clear,  moist mucosa no pharyngeal erythema or exudates   NECK: supple , no JVD   CHEST/LUNG: Clear to auscultation bilaterally; No wheeze, equal breath sounds bilaterally   BACK: No spinal tenderness,  No CVA tenderness   HEART: Regular rate and rhythm; No murmurs, rubs, or gallops  ABDOMEN: Soft, Nontender, Nondistended; Bowel sounds present  EXTREMITIES:  No clubbing, cyanosis, + 1 pitting  edema  b/l   MSK: No joint swelling or effusions, ROM intact   PSYCH: Normal behavior/affect  NEUROLOGY: AAOx1, non-focal, cranial nerves intact, moving all extremities   SKIN: Normal color, No rashes or lesions

## 2023-01-17 NOTE — H&P ADULT - PROBLEM SELECTOR PLAN 1
opacification on R lung ,  symptoms can be 2/2 advanced malignancy vs. superimposed infection , improved with oxygen supplementation ; s/p ceftriaxone and doxycycline   - continue oxygen, wean as tolerated   - Zosyn   - f/u CT chest  - Chest PT / cough assist  - Tylenol prn

## 2023-01-17 NOTE — CONSULT NOTE ADULT - PROBLEM SELECTOR RECOMMENDATION 3
Reported end stage, given current situation, patient not a candidate for treatment.  Symptomatic support would be appropriate

## 2023-01-17 NOTE — CONSULT NOTE ADULT - PROBLEM SELECTOR RECOMMENDATION 5
Left message to speak with son.  Verify status as HCP or even surrogate decision maker and goal for comfort measures.  Symptom directed management is appropriate in end stage lung cancer.  Patient eligible for home hospice services if able to be weaned from high flow.  Awaiting call back from son Ilir.  Patient can be managed on the medical floor, will re-evaluate if condition changes for PCU.      Rosa Mackay MD MSEanthony FACP  Available on Teams during regular business hours  Pager after hours [phone]  Division of Geriatrics and Palliative Medicine

## 2023-01-17 NOTE — ED ADULT NURSE NOTE - NSIMPLEMENTINTERV_GEN_ALL_ED
Implemented All Fall with Harm Risk Interventions:  [place] to call system. Call bell, personal items and telephone within reach. Instruct patient to call for assistance. Room bathroom lighting operational. Non-slip footwear when patient is off stretcher. Physically safe environment: no spills, clutter or unnecessary equipment. Stretcher in lowest position, wheels locked, appropriate side rails in place. Provide visual cue, wrist band, yellow gown, etc. Monitor gait and stability. Monitor for mental status changes and reorient to person, place, and time. Review medications for side effects contributing to fall risk. Reinforce activity limits and safety measures with patient and family. Provide visual clues: red socks.

## 2023-01-17 NOTE — ED PROVIDER NOTE - PROGRESS NOTE DETAILS
Valdemar Stephens PGY-3 spoke with son, currently in [place]. unaware of patient being in ER   Lives with .   On labetalol, simvastatin.   [place] - [phone]  [place] - [phone]  all care at [facility]. end stage lung ca as per son. son is healthcare proxy, state patient is dnr/dni. would like natural death. Valdemar Stephens PGY-3 patient on bipap, confirmed DNRDNI as per son  unsure of last hospitalization, may need HCAP coverage. will eval with MRSA swab, pro ellie, and ct chest for focal consolidation. will broaden coverage as needed. stable for floor admission

## 2023-01-17 NOTE — H&P ADULT - PROBLEM SELECTOR PLAN 3
hold antihypertensives for now, can resume once medication list updated and patient clinically improved

## 2023-01-17 NOTE — H&P ADULT - PROBLEM SELECTOR PLAN 2
receives care at [facility] , per discussion with ED provider and family  currently end stage

## 2023-01-17 NOTE — CONSULT NOTE ADULT - PROBLEM SELECTOR RECOMMENDATION 9
Presently comfortable on high flow.  If consistent with GOC, refractory dyspnea may be managed with Dilaudid, 0.2mg IV every 6 hours PRN.

## 2023-01-17 NOTE — H&P ADULT - PROBLEM SELECTOR PLAN 7
Per discussion with ED provider and son yanelis  son is healthcare proxy, state patient is dnr/dni. would like natural death.

## 2023-01-17 NOTE — ED PROVIDER NOTE - NSICDXPASTSURGICALHX_GEN_ALL_CORE_FT
PAST SURGICAL HISTORY:  S/P cataract surgery     S/P hernia repair bilaterally    S/P prostatectomy

## 2023-01-17 NOTE — ED PROVIDER NOTE - PHYSICAL EXAMINATION
Vital signs reviewed  GENERAL: Patient nontoxic appearing  HEAD: NCAT  EYES: Anicteric  ENT: MMM  NECK: Supple, non tender  RESPIRATORY: +retractions, RR 25, coarse breath sounds R side. +coughing fit  CARDIOVASCULAR: Regular rate and rhythm  ABDOMEN: Soft. Nondistended. Nontender. No guarding or rebound. No CVA tenderness.  MUSCULOSKELETAL/EXTREMITIES: Brisk cap refill. 2+ radial pulses. 1+ pitting edema   SKIN:  Warm and dry  NEURO: AAOx1. No gross FND.  PSYCHIATRIC: Cooperative. Affect appropriate.

## 2023-01-17 NOTE — H&P ADULT - PROBLEM SELECTOR PLAN 5
INR > 2 , suspect patient on anticoagulant at home , however unable to  obtain home medications  will therefore provide LMWH at PPX dose , can discontinue if full dose AC administered   - LMWH

## 2023-01-17 NOTE — ED ADULT NURSE NOTE - OBJECTIVE STATEMENT
89yo M pmh HTN, HLD, BPH, lung ca, presents to ED from home via EMS with c/o SOB. per EMS pt lives at home with wife and wife called EMS d/t pt endorsing shortness of breath for multiple days. on EMS arrival, pt sating 95% on RA but pt placed on 2L NC d/t increased WOB on EMS assessment. EMS also reports pt had 1 episode of hematemesis today, + bright red blood. on arrival to ED pt endorsing SOB and cough. pt denies any chest pain, fevers, chills, or nausea currently. on exam pt is awake and alert, oriented x4, spontaneous respirations with tachypnea to 20s and increased WOB, + cough, tachycardic to 120s, abd soft nt nd, extremities nonedematous. MD at bedside for eval. 18G noted to L wrist placed by EMS PTA. additional access established in ED to R forearm 18G, labs drawn and sent per orders. pt arrives on 2L sating 99%, remains on 2L NC currently. pt placed on CCM and . CXR completed. pending results.

## 2023-01-17 NOTE — H&P ADULT - NSHPLABSRESULTS_GEN_ALL_CORE
Labs personally reviewed:                          8.7    7.37  )-----------( 279      ( 17 Jan 2023 01:16 )             30.7     01-17    143  |  107  |  38<H>  ----------------------------<  130<H>  5.4<H>   |  24  |  1.63<H>    Ca    9.9      17 Jan 2023 01:16    TPro  6.6  /  Alb  3.3  /  TBili  0.2  /  DBili  x   /  AST  9<L>  /  ALT  9<L>  /  AlkPhos  54  01-17        LIVER FUNCTIONS - ( 17 Jan 2023 01:16 )  Alb: 3.3 g/dL / Pro: 6.6 g/dL / ALK PHOS: 54 U/L / ALT: 9 U/L / AST: 9 U/L / GGT: x           PT/INR - ( 17 Jan 2023 01:16 )   PT: 30.2 sec;   INR: 2.60 ratio         PTT - ( 17 Jan 2023 01:16 )  PTT:36.5 sec    CAPILLARY BLOOD GLUCOSE          Imaging:  CXR personally reviewed: Small right pleural effusion with adjacent atelectasis. Superimposed   infection cannot be excluded.      EKG personally reviewed: sinus tachycardic at 139 bpm , LBBB

## 2023-01-17 NOTE — ED PROVIDER NOTE - NSICDXPASTMEDICALHX_GEN_ALL_CORE_FT
PAST MEDICAL HISTORY:  BPH (benign prostatic hypertrophy)     HLD (hyperlipidemia)     HTN (hypertension)

## 2023-01-17 NOTE — ED PROVIDER NOTE - OBJECTIVE STATEMENT
89y M unknown medical hx p/w trouble breathing.   as per EMS patient called in by family member at home due to increase WOB and cough. otherwise limited hx, may have hx of CA unsure of meds  pt limited historian

## 2023-01-17 NOTE — PROGRESS NOTE ADULT - SUBJECTIVE AND OBJECTIVE BOX
patient not known to me, assigned this AM to assume care  chart reviewed and events thus far noted  admitted overnight by hospitalist colleague     plan per H&P  pulmonary evaluation requested  son requested palliative care evaluation   will await recommendations     prognosis guarded  DNR in place  discussed with pulmonary   continue piperacillin/tazobactam empirically for now

## 2023-01-17 NOTE — CONSULT NOTE ADULT - SUBJECTIVE AND OBJECTIVE BOX
HPI:  Patient provides limited history , family and caretaker unavailable at time of interview and unable to be reached by phone.  Patient is a 89 year old male w/pmh dementia , HTN , advanced lung cancer , presents for increased shortness of breath and work of breathing. Per ED provider , patient with intermittent cough ,  labored breathing including retractions on presentation , improved with bipap.    (2023 03:16)    Patient seen in ED Trauma C - alert although speech difficult to understand - on highflow, slightly agitated.      PERTINENT PM/SXH:   HTN (hypertension)  HLD (hyperlipidemia)  BPH (benign prostatic hypertrophy)    S/P cataract surgery  S/P hernia repair  S/P prostatectomy    FAMILY HISTORY:No hx BPH in first degree relatives    Family Hx substance abuse [ ]yes [x ]no    ITEMS NOT CHECKED ARE NOT PRESENT    SOCIAL HISTORY:   Significant other/partner[ x]  Children[x ]  Nondenominational/Spirituality: Anabaptism  Substance hx:  [ ]   Tobacco hx:  [ ]   Alcohol hx: [ ]   Home Opioid hx:  [ ] I-Stop Reference No:  Living Situation: [ ]Home  [ ]Long term care  [ ]Rehab [ ]Other    ADVANCE DIRECTIVES:    DNR/MOLST  [ ]  Living Will  [ ]   DECISION MAKER(s):  [ ] Health Care Proxy(s)  [ ] Surrogate(s)  [ ] Guardian           Name(s): Ilir Plaza  Phone Number(s): [phone]    BASELINE (I)ADL(s) (prior to admission):  [place]: [ ]Total  [ ] Moderate [x ]Dependent    Allergies    No Known Allergies    Intolerances    MEDICATIONS  (STANDING):  enoxaparin Injectable 40 milliGRAM(s) SubCutaneous every 24 hours  piperacillin/tazobactam IVPB.. 3.375 Gram(s) IV Intermittent every 8 hours    MEDICATIONS  (PRN):  acetaminophen     Tablet .. 650 milliGRAM(s) Oral every 6 hours PRN Temp greater or equal to 38C (100.4F), Mild Pain (1 - 3)  melatonin 3 milliGRAM(s) Oral at bedtime PRN Insomnia  ondansetron Injectable 4 milliGRAM(s) IV Push every 8 hours PRN Nausea and/or Vomiting    PRESENT SYMPTOMS: [ x]Unable to self-report  [ ] CPOT [x ] PAINADs [x ] RDOS  Source if other than patient:  [ ]Family   [ ]Team     Pain: [ ]yes [x ]no  QOL impact -   Location -                    Aggravating factors -  Quality -  Radiation -  Timing-  Severity (0-10 scale):  Minimal acceptable level (0-10 scale):       Dyspnea:                           [ ]Mild [ ]Moderate [ ]Severe  Anxiety:                             [ ]Mild [ ]Moderate [ ]Severe  Fatigue:                             [ ]Mild [ ]Moderate [ ]Severe  Nausea:                             [ ]Mild [ ]Moderate [ ]Severe  Loss of appetite:              [ ]Mild [ ]Moderate [ ]Severe  Constipation:                    [ ]Mild [ ]Moderate [ ]Severe    PCSSQ [Palliative Care Spiritual Screening Question]   Severity (0-10):  Score of 4 or > indicate consideration of Chaplaincy referral.  Chaplaincy Referral: [ ] yes [ ] refused [ ] following [ ] deferred    Caregiver [place]? : [ ] yes [ ] no [ x] deferred:  Social work referral [ ] Patient & Family Centered Care Referral [ ]     Anticipatory Grief Present?: [ ] yes [ ] no  [ x] deferred: Social work referral [ ]  Patient & Family Centered Care Referral [ ]       Other Symptoms:  [ ]All other review of systems negative  patient unable to self report    Palliative Performance Status Version 2:  40       %    http://npcrc.org/files/news/palliative_performance_scale_ppsv2.pdf  PHYSICAL EXAM:  Vital Signs Last 24 Hrs  T(C): 36.2 (2023 06:15), Max: 37.7 (2023 01:08)  T(F): 97.2 (2023 06:15), Max: 99.8 (2023 01:08)  HR: 83 (2023 12:55) (79 - 122)  BP: 152/84 (2023 12:55) (86/64 - 158/70)  BP(mean): 99 (2023 12:55) (68 - 99)  RR: 20 (2023 12:55) (16 - 24)  SpO2: 100% (2023 12:55) (95% - 100%)    Parameters below as of 2023 12:55  Patient On (Oxygen Delivery Method): nasal cannula, high flow     I&O's Summary    GENERAL: [ ]Cachexia    [x ]Alert  [ ]Oriented x  0 [ ]Lethargic  [ ]Unarousable  [ ]Verbal  [ ]Non-Verbal  Behavioral:   [ ] Anxiety  [ ] Delirium [ ] Agitation [ ] Other  HEENT:  [ ]Normal   [ ]Dry mouth   [ ]ET Tube/Trach  [ ]Oral lesions  PULMONARY:   [ ]Clear [ ]Tachypnea  [ ]Audible excessive secretions   [ ]Rhonchi        [ ]Right [ ]Left [ ]Bilateral  [ ]Crackles        [ ]Right [ ]Left [ ]Bilateral  [ ]Wheezing     [ ]Right [ ]Left [ ]Bilateral  [ ]Diminished breath sounds [ ]right [ ]left [ ]bilateral  CARDIOVASCULAR:    [ ]Regular [ ]Irregular [ ]Tachy  [ ]Chas [ ]Murmur [ ]Other  GASTROINTESTINAL:  [ ]Soft  [ ]Distended   [ ]+BS  [ ]Non tender [ ]Tender  [ ]Other [ ]PEG [ ]OGT/ NGT  Last BM:  GENITOURINARY:  [ ]Normal [ ] Incontinent   [ ]Oliguria/Anuria   [ ]Lugo  MUSCULOSKELETAL:   [ ]Normal   [ ]Weakness  [ ]Bed/Wheelchair bound [ ]Edema  NEUROLOGIC:   [ ]No focal deficits  [ ]Cognitive impairment  [ ]Dysphagia [ ]Dysarthria [ ]Paresis [ ]Other   SKIN:   [ ]Normal  [ ]Rash  [ ]Other  [ ]Pressure ulcer(s)       Present on admission [ ]y [ ]n    CRITICAL CARE:  [ ] Shock Present  [ ]Septic [ ]Cardiogenic [ ]Neurologic [ ]Hypovolemic  [ ]  Vasopressors [ ]  Inotropes   [ ]Respiratory failure present [ ]Mechanical ventilation [ ]Non-invasive ventilatory support [ ]High flow    [ ]Acute  [ ]Chronic [ ]Hypoxic  [ ]Hypercarbic [ ]Other  [ ]Other organ failure     LABS:                        8.2    7.47  )-----------( 247      ( 2023 05:13 )             29.1       145  |  109<H>  |  38<H>  ----------------------------<  125<H>  5.1   |  24  |  1.59<H>    Ca    9.1      2023 05:13    TPro  6.1  /  Alb  2.9<L>  /  TBili  0.2  /  DBili  x   /  AST  9<L>  /  ALT  8<L>  /  AlkPhos  48  -  PT/INR - ( 2023 01:16 )   PT: 30.2 sec;   INR: 2.60 ratio         PTT - ( 2023 01:16 )  PTT:36.5 sec    Urinalysis Basic - ( 2023 04:39 )    Color: Dark Yellow / Appearance: Slightly Turbid / S.025 / pH: x  Gluc: x / Ketone: Negative  / Bili: Small / Urobili: 3 mg/dL   Blood: x / Protein: 100 mg/dl / Nitrite: Negative   Leuk Esterase: Negative / RBC: 8 /hpf / WBC 6 /HPF   Sq Epi: x / Non Sq Epi: 6 /hpf / Bacteria: Negative      RADIOLOGY & ADDITIONAL STUDIES:  < from: CT Chest No Cont (23 @ 02:59) >    ACC: [id number] EXAM:  CT CHEST                          PROCEDURE DATE:  2023          INTERPRETATION:  CLINICAL INFORMATION: Increased dyspnea in the setting   of lung cancer.    COMPARISON: None.    CONTRAST/COMPLICATIONS:  IV Contrast: NONE  Oral Contrast: NONE  Complications: None reported at time of study completion    PROCEDURE:  CT of the Chest was performed.  Sagittal and coronal reformats were performed.    FINDINGS:    LUNGS AND AIRWAYS: Patent central airways. Right middle lobe airspace   consolidation measuring 8.0 x 6.9 x 5.7 cm. Adjacent patchy airspace   opacities in the right middle lobe. Right lower lobe and lingular   subsegmental atelectasis. Left upper lobe pulmonary nodules measuring up   to 6 mm (series 3, image 197 and 219) and 4 mm (series 3, image 162).   Intrafissural lymph nodes in the left major fissure. Centrilobular   emphysema most prominent in the upper lobes. Right upper lobe calcified   granuloma.  PLEURA: No pleural effusion. Small amount of fluidin the right major   fissure.  MEDIASTINUM AND SHARON: Enlarged right lower paratracheal duct measuring   1.2 x 1.0 cm. Enlarged subcarinal lymph node measuring 1.7 x 1.4 cm.  VESSELS: Calcified plaque in the aortic arch and coronary arteries.  HEART: Heart size is enlarged. No pericardial effusion.  CHEST WALL AND LOWER NECK: Mild bilateral gynecomastia.  VISUALIZED UPPER ABDOMEN: Left renal cyst. Nonspecific left perinephric   fat stranding.  BONES: Degenerative changes of the spine. Old posterior left 10th and   11th rib fractures.    IMPRESSION:    1. Right middle lobe airspace consolidation likely infectious in   etiology; however, an underlying mass with associated post obstructive   pneumonia cannot be excluded.  2. Left upper lobe pulmonary nodules measuring up to 6 mm.    --- End of Report ---           ANGELICA AHMADI MD; Resident Radiologist  This document has been electronically signed.  MISA PAREDES MD; Attending Radiologist  This document has been electronically signed. 2023  5:13AM    < end of copied text >      PROTEIN CALORIE MALNUTRITION PRESENT: [ ]mild [ ]moderate [ ]severe [ ]underweight [ ]morbid obesity  https://www.andeal.org/vault/2440/web/files/ONC/Table_Clinical%20Characteristics%20to%20Document%20Malnutrition-White%20JV%20et%20al%2020.pdf      Weight (kg): 86.2 (23 @ 00:49)    [ ]PPSV2 < or = to 30% [ ]significant weight loss  [ ]poor nutritional intake  [ ]anasarca[ ]Artificial Nutrition      Other REFERRALS:  [ ]Hospice  [ ]Child Life  [ ]Social Work  [ ]Case management [ ]Holistic Therapy     No care coordination note HPI:  Patient provides limited history , family and caretaker unavailable at time of interview and unable to be reached by phone.  Patient is a 89 year old male w/pmh dementia , HTN , advanced lung cancer , presents for increased shortness of breath and work of breathing. Per ED provider , patient with intermittent cough ,  labored breathing including retractions on presentation , improved with bipap.    (2023 03:16)    Patient seen in ED Trauma C - alert although speech difficult to understand - on highflow, slightly agitated.      PERTINENT PM/SXH:   HTN (hypertension)  HLD (hyperlipidemia)  BPH (benign prostatic hypertrophy)    S/P cataract surgery  S/P hernia repair  S/P prostatectomy    FAMILY HISTORY:No hx BPH in first degree relatives    Family Hx substance abuse [ ]yes [x ]no    ITEMS NOT CHECKED ARE NOT PRESENT    SOCIAL HISTORY:   Significant other/partner[ x]  Children[x ]  Oriental orthodox/Spirituality: Mormonism  Substance hx:  [ ]   Tobacco hx:  [ ]   Alcohol hx: [ ]   Home Opioid hx:  [ ] I-Stop Reference No:  Living Situation: [x ]Home   with care provider  [ ]Long term care  [ ]Rehab [ ]Other    ADVANCE DIRECTIVES:    DNR/MOLST  [ ]  Living Will  [ ]   DECISION MAKER(s):  [ ] Health Care Proxy(s)  [ ] Surrogate(s)  [ ] Guardian           Name(s): Ilir Plaza  Phone Number(s): [phone]    BASELINE (I)ADL(s) (prior to admission):  [place]: [ ]Total  [ ] Moderate [x ]Dependent    Allergies    No Known Allergies    Intolerances    MEDICATIONS  (STANDING):  enoxaparin Injectable 40 milliGRAM(s) SubCutaneous every 24 hours  piperacillin/tazobactam IVPB.. 3.375 Gram(s) IV Intermittent every 8 hours    MEDICATIONS  (PRN):  acetaminophen     Tablet .. 650 milliGRAM(s) Oral every 6 hours PRN Temp greater or equal to 38C (100.4F), Mild Pain (1 - 3)  melatonin 3 milliGRAM(s) Oral at bedtime PRN Insomnia  ondansetron Injectable 4 milliGRAM(s) IV Push every 8 hours PRN Nausea and/or Vomiting    PRESENT SYMPTOMS: [ x]Unable to self-report  [ ] CPOT [x ] PAINADs [x ] RDOS  Source if other than patient:  [ ]Family   [ ]Team     Pain: [ ]yes [x ]no  QOL impact -   Location -                    Aggravating factors -  Quality -  Radiation -  Timing-  Severity (0-10 scale):  Minimal acceptable level (0-10 scale):       Dyspnea:                           [ ]Mild [ ]Moderate [ ]Severe  Anxiety:                             [ ]Mild [ ]Moderate [ ]Severe  Fatigue:                             [ ]Mild [ ]Moderate [ ]Severe  Nausea:                             [ ]Mild [ ]Moderate [ ]Severe  Loss of appetite:              [ ]Mild [ ]Moderate [ ]Severe  Constipation:                    [ ]Mild [ ]Moderate [ ]Severe    PCSSQ [Palliative Care Spiritual Screening Question]   Severity (0-10):  Score of 4 or > indicate consideration of Chaplaincy referral.  Chaplaincy Referral: [ ] yes [ ] refused [ ] following [ ] deferred    Caregiver [place]? : [ ] yes [ ] no [ x] deferred:  Social work referral [ ] Patient & Family Centered Care Referral [ ]     Anticipatory Grief Present?: [ ] yes [ ] no  [ x] deferred: Social work referral [ ]  Patient & Family Centered Care Referral [ ]       Other Symptoms:  [ ]All other review of systems negative  patient unable to self report    Palliative Performance Status Version 2:  40       %    http://npcrc.org/files/news/palliative_performance_scale_ppsv2.pdf  PHYSICAL EXAM:  Vital Signs Last 24 Hrs  T(C): 36.2 (2023 06:15), Max: 37.7 (2023 01:08)  T(F): 97.2 (2023 06:15), Max: 99.8 (2023 01:08)  HR: 83 (2023 12:55) (79 - 122)  BP: 152/84 (2023 12:55) (86/64 - 158/70)  BP(mean): 99 (2023 12:55) (68 - 99)  RR: 20 (2023 12:55) (16 - 24)  SpO2: 100% (2023 12:55) (95% - 100%)    Parameters below as of 2023 12:55  Patient On (Oxygen Delivery Method): nasal cannula, high flow     I&O's Summary    GENERAL: [ ]Cachexia    [x ]Alert  [ ]Oriented x  0 [ ]Lethargic  [ ]Unarousable  [ x]Verbal  [ ]Non-Verbal  Behavioral:   [ ] Anxiety  [ ] Delirium x ] Agitation [ ] Other  HEENT:  [x ]Normal   [ ]Dry mouth   [ ]ET Tube/Trach  [ ]Oral lesions  PULMONARY:   [x ]Clear [ ]Tachypnea  [ ]Audible excessive secretions   [ ]Rhonchi        [ ]Right [ ]Left [ ]Bilateral  [ ]Crackles        [ ]Right [ ]Left [ ]Bilateral  [ ]Wheezing     [ ]Right [x ]Left [ ]Bilateral  [x ]Diminished breath sounds [ ]right [ ]left [ ]bilateral  CARDIOVASCULAR:    [x ]Regular [ ]Irregular [ ]Tachy  [ ]Chas [ ]Murmur [ ]Other  GASTROINTESTINAL:  [x ]Soft  [ ]Distended   [x ]+BS  [x ]Non tender [ ]Tender  [ ]Other [ ]PEG [ ]OGT/ NGT  Last BM: unknown  GENITOURINARY:  [x ]Normal [ ] Incontinent   [ ]Oliguria/Anuria   [ ]Lugo  MUSCULOSKELETAL:   [ ]Normal   [x ]Weakness  [ ]Bed/Wheelchair bound [ ]Edema  NEUROLOGIC:   [ ]No focal deficits  [ x]Cognitive impairment  [ ]Dysphagia [x ]Dysarthria [ ]Paresis [ ]Other   SKIN:   [ x]Normal  [ ]Rash  [ ]Other  [ ]Pressure ulcer(s)       Present on admission [ ]y [ ]n    CRITICAL CARE:  [ ] Shock Present  [ ]Septic [ ]Cardiogenic [ ]Neurologic [ ]Hypovolemic  [ ]  Vasopressors [ ]  Inotropes   [x ]Respiratory failure present [ ]Mechanical ventilation [ ]Non-invasive ventilatory support [x ]High flow    [ ]Acute  [ ]Chronic [ ]Hypoxic  [ ]Hypercarbic [ ]Other  [ ]Other organ failure     LABS:                        8.2    7.47  )-----------( 247      ( 2023 05:13 )             29.1       145  |  109<H>  |  38<H>  ----------------------------<  125<H>  5.1   |  24  |  1.59<H>    Ca    9.1      2023 05:13    TPro  6.1  /  Alb  2.9<L>  /  TBili  0.2  /  DBili  x   /  AST  9<L>  /  ALT  8<L>  /  AlkPhos  48    PT/INR - ( 2023 01:16 )   PT: 30.2 sec;   INR: 2.60 ratio         PTT - ( 2023 01:16 )  PTT:36.5 sec    Urinalysis Basic - ( 2023 04:39 )    Color: Dark Yellow / Appearance: Slightly Turbid / S.025 / pH: x  Gluc: x / Ketone: Negative  / Bili: Small / Urobili: 3 mg/dL   Blood: x / Protein: 100 mg/dl / Nitrite: Negative   Leuk Esterase: Negative / RBC: 8 /hpf / WBC 6 /HPF   Sq Epi: x / Non Sq Epi: 6 /hpf / Bacteria: Negative      RADIOLOGY & ADDITIONAL STUDIES:  < from: CT Chest No Cont (23 @ 02:59) >    ACC: [id number] EXAM:  CT CHEST                          PROCEDURE DATE:  2023          INTERPRETATION:  CLINICAL INFORMATION: Increased dyspnea in the setting   of lung cancer.    COMPARISON: None.    CONTRAST/COMPLICATIONS:  IV Contrast: NONE  Oral Contrast: NONE  Complications: None reported at time of study completion    PROCEDURE:  CT of the Chest was performed.  Sagittal and coronal reformats were performed.    FINDINGS:    LUNGS AND AIRWAYS: Patent central airways. Right middle lobe airspace   consolidation measuring 8.0 x 6.9 x 5.7 cm. Adjacent patchy airspace   opacities in the right middle lobe. Right lower lobe and lingular   subsegmental atelectasis. Left upper lobe pulmonary nodules measuring up   to 6 mm (series 3, image 197 and 219) and 4 mm (series 3, image 162).   Intrafissural lymph nodes in the left major fissure. Centrilobular   emphysema most prominent in the upper lobes. Right upper lobe calcified   granuloma.  PLEURA: No pleural effusion. Small amount of fluidin the right major   fissure.  MEDIASTINUM AND SHARON: Enlarged right lower paratracheal duct measuring   1.2 x 1.0 cm. Enlarged subcarinal lymph node measuring 1.7 x 1.4 cm.  VESSELS: Calcified plaque in the aortic arch and coronary arteries.  HEART: Heart size is enlarged. No pericardial effusion.  CHEST WALL AND LOWER NECK: Mild bilateral gynecomastia.  VISUALIZED UPPER ABDOMEN: Left renal cyst. Nonspecific left perinephric   fat stranding.  BONES: Degenerative changes of the spine. Old posterior left 10th and   11th rib fractures.    IMPRESSION:    1. Right middle lobe airspace consolidation likely infectious in   etiology; however, an underlying mass with associated post obstructive   pneumonia cannot be excluded.  2. Left upper lobe pulmonary nodules measuring up to 6 mm.    --- End of Report ---           ANGELCIA AHMADI MD; Resident Radiologist  This document has been electronically signed.  MISA PAREDES MD; Attending Radiologist  This document has been electronically signed. 2023  5:13AM    < end of copied text >      PROTEIN CALORIE MALNUTRITION PRESENT: [ ]mild [ ]moderate [ ]severe [ ]underweight [ ]morbid obesity  https://www.andeal.org/vault/2440/web/files/ONC/Table_Clinical%20Characteristics%20to%20Document%20Malnutrition-White%20JV%20et%20al%2020.pdf      Weight (kg): 86.2 (23 @ 00:49)    [ ]PPSV2 < or = to 30% [ ]significant weight loss  [ ]poor nutritional intake  [ ]anasarca[ ]Artificial Nutrition      Other REFERRALS:  [ ]Hospice  [ ]Child Life  [ ]Social Work  [ ]Case management [ ]Holistic Therapy     No care coordination note

## 2023-01-17 NOTE — CONSULT NOTE ADULT - PROBLEM SELECTOR RECOMMENDATION 4
Likely at least a 6e if not 7 or above.  Impossible to quantify at the moment.  Need more information.

## 2023-01-17 NOTE — ED PROVIDER NOTE - CLINICAL SUMMARY MEDICAL DECISION MAKING FREE TEXT BOX
89y M unknown medical hx p/w trouble breathing.   sat 95% on ra, +retractions, increase WOB. concern for pneumonia, pe, pleural effusions, cad.   will obtain labs and imaging. will start bipap as needed. likely admission

## 2023-01-17 NOTE — H&P ADULT - PROBLEM SELECTOR PLAN 6
Patient unable to provide accurate detailed home medication list , family unavailable at time of interview.   - pharm tech emailed to update and verify home medications , day team to reconciled once completed

## 2023-01-18 NOTE — CHART NOTE - NSCHARTNOTEFT_GEN_A_CORE
spoke to his son : who has hcp; he does not want any think aggressive understand that he may die in the hospital : Palliative care should seem him today :    kristin np

## 2023-01-18 NOTE — PROGRESS NOTE ADULT - PROBLEM SELECTOR PLAN 1
likely secondary to multifactorial reasons including advanced lung ca and likely superimposed pneumonia   minimally improved  hemoptysis overnight  no intervention at this time per pulmonary based upon son's wishes  supportive care

## 2023-01-18 NOTE — PROGRESS NOTE ADULT - SUBJECTIVE AND OBJECTIVE BOX
Date of Service: 23 @ 14:18    Patient is a 89y old  Male who presents with a chief complaint of SOB x1 day (2023 13:54)      Any change in ROS: pt is not doing well : on high  flow agitated  had hemoptysis in daytime     MEDICATIONS  (STANDING):  chlorhexidine 2% Cloths 1 Application(s) Topical daily  enoxaparin Injectable 40 milliGRAM(s) SubCutaneous every 24 hours  piperacillin/tazobactam IVPB.. 3.375 Gram(s) IV Intermittent every 8 hours    MEDICATIONS  (PRN):  acetaminophen     Tablet .. 650 milliGRAM(s) Oral every 6 hours PRN Temp greater or equal to 38C (100.4F), Mild Pain (1 - 3)  guaiFENesin Oral Liquid (Sugar-Free) 100 milliGRAM(s) Oral every 6 hours PRN Cough  melatonin 3 milliGRAM(s) Oral at bedtime PRN Insomnia  ondansetron Injectable 4 milliGRAM(s) IV Push every 8 hours PRN Nausea and/or Vomiting    Vital Signs Last 24 Hrs  T(C): 36.6 (2023 09:03), Max: 36.7 (2023 17:53)  T(F): 97.9 (2023 09:03), Max: 98 (2023 17:53)  HR: 88 (2023 10:55) (82 - 115)  BP: 160/84 (2023 09:03) (137/87 - 161/72)  BP(mean): 87 (2023 15:35) (87 - 87)  RR: 20 (2023 10:55) (19 - 20)  SpO2: 98% (2023 10:55) (96% - 100%)    Parameters below as of 2023 10:55  Patient On (Oxygen Delivery Method): nasal cannula, high flow  O2 Flow (L/min): 50  O2 Concentration (%): 75    I&O's Summary    2023 07:01  -  2023 14:18  --------------------------------------------------------  IN: 200 mL / OUT: 0 mL / NET: 200 mL          Physical Exam:   GENERAL: NAD, well-groomed, well-developed  HEENT: CORKY/   Atraumatic, Normocephalic  ENMT: No tonsillar erythema, exudates, or enlargement; Moist mucous membranes, Good dentition, No lesions  NECK: Supple, No JVD, Normal thyroid  CHEST/LUNG: Clear to auscultaion; no wheezing  CVS: Regular rate and rhythm; No murmurs, rubs, or gallops  GI: : Soft, Nontender, Nondistended; Bowel sounds present  NERVOUS SYSTEM:  awake : agitated  EXTREMITIES: -edema  LYMPH: No lymphadenopathy noted  SKIN: No rashes or lesions  ENDOCRINOLOGY: No Thyromegaly  PSYCH: calm     Labs:  28                            8.7    9.36  )-----------( 288      ( 2023 07:09 )             31.1                         8.2    7.47  )-----------( 247      ( 2023 05:13 )             29.1                         8.7    7.37  )-----------( 279      ( 2023 01:16 )             30.7     01-18    147<H>  |  108  |  46<H>  ----------------------------<  70  5.0   |  21<L>  |  1.76<H>      145  |  109<H>  |  38<H>  ----------------------------<  125<H>  5.1   |  24  |  1.59<H>      143  |  107  |  38<H>  ----------------------------<  130<H>  5.4<H>   |  24  |  1.63<H>    Ca    9.5      2023 07:00  Ca    9.1      2023 05:13  Ca    9.9      2023 01:16    TPro  6.3  /  Alb  2.9<L>  /  TBili  0.2  /  DBili  x   /  AST  12  /  ALT  8<L>  /  AlkPhos  48  -18  TPro  6.1  /  Alb  2.9<L>  /  TBili  0.2  /  DBili  x   /  AST  9<L>  /  ALT  8<L>  /  AlkPhos  48  -17  TPro  6.6  /  Alb  3.3  /  TBili  0.2  /  DBili  x   /  AST  9<L>  /  ALT  9<L>  /  AlkPhos  54  -17    CAPILLARY BLOOD GLUCOSE          LIVER FUNCTIONS - ( 2023 07:00 )  Alb: 2.9 g/dL / Pro: 6.3 g/dL / ALK PHOS: 48 U/L / ALT: 8 U/L / AST: 12 U/L / GGT: x           PT/INR - ( 2023 01:16 )   PT: 30.2 sec;   INR: 2.60 ratio         PTT - ( 2023 01:16 )  PTT:36.5 sec  Urinalysis Basic - ( 2023 04:39 )    Color: Dark Yellow / Appearance: Slightly Turbid / S.025 / pH: x  Gluc: x / Ketone: Negative  / Bili: Small / Urobili: 3 mg/dL   Blood: x / Protein: 100 mg/dl / Nitrite: Negative   Leuk Esterase: Negative / RBC: 8 /hpf / WBC 6 /HPF   Sq Epi: x / Non Sq Epi: 6 /hpf / Bacteria: Negative      Procalcitonin, Serum: 0.14 ng/mL ( @ 01:16)        RECENT CULTURES:   @ 04:39 Clean Catch Clean Catch (Midstream)                No growth     @ 01:12 .Blood Blood-Peripheral            rad< from: CT Chest No Cont (23 @ 02:59) >  opacities in the right middle lobe. Right lower lobe and lingular   subsegmental atelectasis. Left upper lobe pulmonary nodules measuring up   to 6 mm (series 3, image 197 and 219) and 4 mm (series 3, image 162).   Intrafissural lymph nodes in the left major fissure. Centrilobular   emphysema most prominent in the upper lobes. Right upper lobe calcified   granuloma.  PLEURA: No pleural effusion. Small amount of fluidin the right major   fissure.  MEDIASTINUM AND SHARON: Enlarged right lower paratracheal duct measuring   1.2 x 1.0 cm. Enlarged subcarinal lymph node measuring 1.7 x 1.4 cm.  VESSELS: Calcified plaque in the aortic arch and coronary arteries.  HEART: Heart size is enlarged. No pericardial effusion.  CHEST WALL AND LOWER NECK: Mild bilateral gynecomastia.  VISUALIZED UPPER ABDOMEN: Left renal cyst. Nonspecific left perinephric   fat stranding.  BONES: Degenerative changes of the spine. Old posterior left 10th and   11th rib fractures.    IMPRESSION:    1. Right middle lobe airspace consolidation likely infectious in   etiology; however, an underlying mass with associated post obstructive   pneumonia cannot be excluded.  2. Left upper lobe pulmonary nodules measuring up to 6 mm.    --- End of Report ---           ANGELICA AHMADI MD; Resident Radiologist  This document has been electronically signed.  MISA PAREDES MD; Attending Radiologist  This document has been electronically signed. 2023  5:13AM    < end of copied text >      No growth to date.     @ 01:10 .Blood Blood-Peripheral                No growth to date.          RESPIRATORY CULTURES:          Studies  Chest X-RAY  CT SCAN Chest   Venous Dopplers: LE:   CT Abdomen  Others

## 2023-01-18 NOTE — PROGRESS NOTE ADULT - ASSESSMENT
89 year old male w/pmh dementia , HTN , advanced lung cancer , presents for increased shortness of breath and work of breathing.      Hyoxic resp failure   ? Ppst obstructive pneumonia:   Lung cancer: ? stage   htn:  dementia:     1/17/2023:      Hyoxic resp failure / Hemoptysis:eliquis stopped:  on high flow 75%  ? Ppst obstructive pneumonia:  check all cultures:  cont antibtiocs:   Lung cancer: ? stage  : need to have more information:  kristin son : wants him to be comfortable:   htn: controlled  dementia: supportive care: :  kristin acp : reportedly : son wants palliative care: :  cont supportive care

## 2023-01-18 NOTE — PROGRESS NOTE ADULT - SUBJECTIVE AND OBJECTIVE BOX
Patient is a 89y old  Male who presents with a chief complaint of SOB  day (2023 14:18)      DATE OF SERVICE: 23 @ 15:13    SUBJECTIVE / OVERNIGHT EVENTS: overnight events noted    ROS:  Resp: ++ hemoptysis this AM per RN  CVS: No chest pain no palpitations no orthopnea  GI: no N/V/D  : no dysuria, no hematuria      MEDICATIONS  (STANDING):  chlorhexidine 2% Cloths 1 Application(s) Topical daily  dextrose 5% + sodium chloride 0.9%. 1000 milliLiter(s) (50 mL/Hr) IV Continuous <Continuous>  enoxaparin Injectable 40 milliGRAM(s) SubCutaneous every 24 hours  piperacillin/tazobactam IVPB.. 3.375 Gram(s) IV Intermittent every 8 hours    MEDICATIONS  (PRN):  acetaminophen     Tablet .. 650 milliGRAM(s) Oral every 6 hours PRN Temp greater or equal to 38C (100.4F), Mild Pain (1 - 3)  guaiFENesin Oral Liquid (Sugar-Free) 100 milliGRAM(s) Oral every 6 hours PRN Cough  melatonin 3 milliGRAM(s) Oral at bedtime PRN Insomnia  ondansetron Injectable 4 milliGRAM(s) IV Push every 8 hours PRN Nausea and/or Vomiting        CAPILLARY BLOOD GLUCOSE        I&O's Summary    2023 07:01  -  2023 15:13  --------------------------------------------------------  IN: 200 mL / OUT: 0 mL / NET: 200 mL        Vital Signs Last 24 Hrs  T(C): 36.6 (2023 09:03), Max: 36.7 (2023 17:53)  T(F): 97.9 (2023 09:03), Max: 98 (2023 17:53)  HR: 88 (2023 10:55) (82 - 115)  BP: 160/84 (2023 09:03) (137/87 - 161/72)  BP(mean): 87 (2023 15:35) (87 - 87)  RR: 20 (2023 10:55) (19 - 20)  SpO2: 98% (2023 10:55) (96% - 100%)    PHYSICAL EXAM:  GENERAL: in no apparent distress  Hi Hieu nasal cannula on  HEAD:  Atraumatic, Normocephalic  EYES: EOMI, PERRLA, sclera clear  NECK: Supple, No JVD  CHEST/LUNG: clear no wheeze  HEART: S1 S2; ejection systolic murmur +   ABDOMEN: Soft, Nontender, Bowel sounds present  EXTREMITIES: no edema  NEUROLOGY: Alert responsive grossly non-focal  SKIN: No rashes or lesions    LABS:                        8.7    9.36  )-----------( 288      ( 2023 07:09 )             31.1         147<H>  |  108  |  46<H>  ----------------------------<  70  5.0   |  21<L>  |  1.76<H>    Ca    9.5      2023 07:00    TPro  6.3  /  Alb  2.9<L>  /  TBili  0.2  /  DBili  x   /  AST  12  /  ALT  8<L>  /  AlkPhos  48  -18    PT/INR - ( 2023 01:16 )   PT: 30.2 sec;   INR: 2.60 ratio         PTT - ( 2023 01:16 )  PTT:36.5 sec      Urinalysis Basic - ( 2023 04:39 )    Color: Dark Yellow / Appearance: Slightly Turbid / S.025 / pH: x  Gluc: x / Ketone: Negative  / Bili: Small / Urobili: 3 mg/dL   Blood: x / Protein: 100 mg/dl / Nitrite: Negative   Leuk Esterase: Negative / RBC: 8 /hpf / WBC 6 /HPF   Sq Epi: x / Non Sq Epi: 6 /hpf / Bacteria: Negative          All consultant(s) notes reviewed and care discussed with other providers        Contact Number, Dr Tobar [phone]

## 2023-01-19 NOTE — PROGRESS NOTE ADULT - SUBJECTIVE AND OBJECTIVE BOX
Date of Service: 01-19-23 @ 11:07    Patient is a 89y old  Male who presents with a chief complaint of SOB x1 day (18 Jan 2023 15:13)      Any change in ROS:  doing same : o n high flow  60%:    doing  poorly:   no reported hemoptysis again : he is agitated and restless:     MEDICATIONS  (STANDING):  chlorhexidine 2% Cloths 1 Application(s) Topical daily  dextrose 5% + sodium chloride 0.9%. 1000 milliLiter(s) (50 mL/Hr) IV Continuous <Continuous>  labetalol 200 milliGRAM(s) Oral two times a day  piperacillin/tazobactam IVPB.. 3.375 Gram(s) IV Intermittent every 8 hours    MEDICATIONS  (PRN):  acetaminophen     Tablet .. 650 milliGRAM(s) Oral every 6 hours PRN Temp greater or equal to 38C (100.4F), Mild Pain (1 - 3)  guaiFENesin Oral Liquid (Sugar-Free) 100 milliGRAM(s) Oral every 6 hours PRN Cough  melatonin 3 milliGRAM(s) Oral at bedtime PRN Insomnia  ondansetron Injectable 4 milliGRAM(s) IV Push every 8 hours PRN Nausea and/or Vomiting    Vital Signs Last 24 Hrs  T(C): 36.8 (19 Jan 2023 09:37), Max: 36.8 (19 Jan 2023 09:37)  T(F): 98.2 (19 Jan 2023 09:37), Max: 98.2 (19 Jan 2023 09:37)  HR: 78 (19 Jan 2023 09:37) (78 - 98)  BP: 150/67 (19 Jan 2023 09:37) (147/76 - 158/71)  BP(mean): --  RR: 18 (19 Jan 2023 09:37) (18 - 20)  SpO2: 100% (19 Jan 2023 09:37) (98% - 100%)    Parameters below as of 19 Jan 2023 09:37  Patient On (Oxygen Delivery Method): nasal cannula, high flow        I&O's Summary    18 Jan 2023 07:01  -  19 Jan 2023 07:00  --------------------------------------------------------  IN: 200 mL / OUT: 150 mL / NET: 50 mL          Physical Exam:   GENERAL: NAD, well-groomed, well-developed  HEENT: CORKY/   Atraumatic, Normocephalic  ENMT: No tonsillar erythema, exudates, or enlargement; Moist mucous membranes, Good dentition, No lesions  NECK: Supple, No JVD, Normal thyroid  CHEST/LUNG: no wheezing  CVS: Regular rate and rhythm; No murmurs, rubs, or gallops  GI: : Soft, Nontender, Nondistended; Bowel sounds present  NERVOUS SYSTEM:  awake but  unresponsive : confused  EXTREMITIES:  - edema  LYMPH: No lymphadenopathy noted  SKIN: No rashes or lesions  ENDOCRINOLOGY: No Thyromegaly  PSYCH: calm     Labs:  28                            8.0    8.57  )-----------( 229      ( 19 Jan 2023 10:40 )             29.1                         8.7    9.36  )-----------( 288      ( 18 Jan 2023 07:09 )             31.1                         8.2    7.47  )-----------( 247      ( 17 Jan 2023 05:13 )             29.1                         8.7    7.37  )-----------( 279      ( 17 Jan 2023 01:16 )             30.7     01-18    147<H>  |  108  |  46<H>  ----------------------------<  70  5.0   |  21<L>  |  1.76<H>  01-17    145  |  109<H>  |  38<H>  ----------------------------<  125<H>  5.1   |  24  |  1.59<H>  01-17    143  |  107  |  38<H>  ----------------------------<  130<H>  5.4<H>   |  24  |  1.63<H>    Ca    9.5      18 Jan 2023 07:00    TPro  6.3  /  Alb  2.9<L>  /  TBili  0.2  /  DBili  x   /  AST  12  /  ALT  8<L>  /  AlkPhos  48  01-18  TPro  6.1  /  Alb  2.9<L>  /  TBili  0.2  /  DBili  x   /  AST  9<L>  /  ALT  8<L>  /  AlkPhos  48  01-17  TPro  6.6  /  Alb  3.3  /  TBili  0.2  /  DBili  x   /  AST  9<L>  /  ALT  9<L>  /  AlkPhos  54  01-17    CAPILLARY BLOOD GLUCOSE          LIVER FUNCTIONS - ( 18 Jan 2023 07:00 )  Alb: 2.9 g/dL / Pro: 6.3 g/dL / ALK PHOS: 48 U/L / ALT: 8 U/L / AST: 12 U/L / GGT: x               Procalcitonin, Serum: 0.14 ng/mL (01-17 @ 01:16)        RECENT CULTURES:  01-17 @ 04:39 Clean Catch Clean Catch (Midstream)            rad< from: CT Chest No Cont (01.17.23 @ 02:59) >  FINDINGS:    LUNGS AND AIRWAYS: Patent central airways. Right middle lobe airspace   consolidation measuring 8.0 x 6.9 x 5.7 cm. Adjacent patchy airspace   opacities in the right middle lobe. Right lower lobe and lingular   subsegmental atelectasis. Left upper lobe pulmonary nodules measuring up   to 6 mm (series 3, image 197 and 219) and 4 mm (series 3, image 162).   Intrafissural lymph nodes in the left major fissure. Centrilobular   emphysema most prominent in the upper lobes. Right upper lobe calcified   granuloma.  PLEURA: No pleural effusion. Small amount of fluidin the right major   fissure.  MEDIASTINUM AND SHARON: Enlarged right lower paratracheal duct measuring   1.2 x 1.0 cm. Enlarged subcarinal lymph node measuring 1.7 x 1.4 cm.  VESSELS: Calcified plaque in the aortic arch and coronary arteries.  HEART: Heart size is enlarged. No pericardial effusion.  CHEST WALL AND LOWER NECK: Mild bilateral gynecomastia.  VISUALIZED UPPER ABDOMEN: Left renal cyst. Nonspecific left perinephric   fat stranding.  BONES: Degenerative changes of the spine. Old posterior left 10th and   11th rib fractures.    IMPRESSION:    1. Right middle lobe airspace consolidation likely infectious in   etiology; however, an underlying mass with associated post obstructive   pneumonia cannot be excluded.  2. Left upper lobe pulmonary nodules measuring up to 6 mm.    --- End of Report ---           ANGELICA AHMADI MD; Resident Radiologist  This document has been electronically signed.  MISA PAREDES MD; Attending Radiologist  This document has been electronically signed. Jan 17 2023  5:13AM    < end of copied text >      No growth    01-17 @ 01:12 .Blood Blood-Peripheral                No growth to date.    01-17 @ 01:10 .Blood Blood-Peripheral                No growth to date.          RESPIRATORY CULTURES:          Studies  Chest X-RAY  CT SCAN Chest   Venous Dopplers: LE:   CT Abdomen  Others

## 2023-01-19 NOTE — PROGRESS NOTE ADULT - SUBJECTIVE AND OBJECTIVE BOX
Patient is a 89y old  Male who presents with a chief complaint of SOB x1 day (19 Jan 2023 11:06)      DATE OF SERVICE: 01-19-23 @ 14:05    SUBJECTIVE / OVERNIGHT EVENTS: overnight events noted    ROS:  Resp: No cough no sputum production  CVS: No chest pain no palpitations no orthopnea  GI: no N/V/D  appears much more alert          MEDICATIONS  (STANDING):  chlorhexidine 2% Cloths 1 Application(s) Topical daily  dextrose 5%. 1000 milliLiter(s) (50 mL/Hr) IV Continuous <Continuous>  labetalol 200 milliGRAM(s) Oral two times a day  piperacillin/tazobactam IVPB.. 3.375 Gram(s) IV Intermittent every 8 hours    MEDICATIONS  (PRN):  acetaminophen     Tablet .. 650 milliGRAM(s) Oral every 6 hours PRN Temp greater or equal to 38C (100.4F), Mild Pain (1 - 3)  guaiFENesin Oral Liquid (Sugar-Free) 100 milliGRAM(s) Oral every 6 hours PRN Cough  melatonin 3 milliGRAM(s) Oral at bedtime PRN Insomnia  ondansetron Injectable 4 milliGRAM(s) IV Push every 8 hours PRN Nausea and/or Vomiting        CAPILLARY BLOOD GLUCOSE        I&O's Summary    18 Jan 2023 07:01  -  19 Jan 2023 07:00  --------------------------------------------------------  IN: 200 mL / OUT: 150 mL / NET: 50 mL    19 Jan 2023 07:01  -  19 Jan 2023 14:05  --------------------------------------------------------  IN: 220 mL / OUT: 0 mL / NET: 220 mL        Vital Signs Last 24 Hrs  T(C): 36.8 (19 Jan 2023 09:37), Max: 36.8 (19 Jan 2023 09:37)  T(F): 98.2 (19 Jan 2023 09:37), Max: 98.2 (19 Jan 2023 09:37)  HR: 78 (19 Jan 2023 09:37) (78 - 98)  BP: 150/67 (19 Jan 2023 09:37) (147/76 - 158/71)  BP(mean): --  RR: 18 (19 Jan 2023 09:37) (18 - 20)  SpO2: 100% (19 Jan 2023 09:37) (98% - 100%)      PHYSICAL EXAM:  Hi Hieu nasal cannula on  EYES: EOMI, PERRLA  NECK: Supple, No JVD  CHEST/LUNG: clear no wheeze  HEART: S1 S2; ejection murmur +   ABDOMEN: Soft, Nontender  EXTREMITIES: no edema  NEUROLOGY: Alert more responsive  SKIN: No rashes or lesions    LABS:                        8.0    8.57  )-----------( 229      ( 19 Jan 2023 10:40 )             29.1     01-19    149<H>  |  111<H>  |  54<H>  ----------------------------<  119<H>  5.0   |  25  |  1.88<H>    Ca    9.7      19 Jan 2023 10:40    TPro  6.3  /  Alb  2.9<L>  /  TBili  0.2  /  DBili  x   /  AST  12  /  ALT  8<L>  /  AlkPhos  48  01-18                All consultant(s) notes reviewed and care discussed with other providers        Contact Number, Dr Tobar [phone]

## 2023-01-19 NOTE — CHART NOTE - NSCHARTNOTEFT_GEN_A_CORE
89y/o M PMH of dementia , HTN , advanced lung cancer , presents for increased shortness of breath and work of breathing. The patient is being  treated empirically for post obstructive PNA. Dx: AHRF- likley due to advanced malignancy / Infection- s/p ceftriaxone and doxycycline. The patient was transferred this afternoon to the PCU for symptom management.  - Restraints d/c.  - Symptom management medications ordered ( Dilaudid 0.2mg IV q1hr PRN for moderate pain, Dilaudid 0.3mg IV q1hr PRN for severe pain & dyspnea, Ativan 0.5mg IV q1hr PRN for agitation and PRN Robinul 0.4mg IV q6hr PRN for secretion).  - Will continue to closely monitor. 89y/o M PMH of dementia , HTN , advanced lung cancer , presents for increased shortness of breath and work of breathing. The patient is being  treated empirically for post obstructive PNA. Dx: AHRF- likley due to advanced malignancy / Infection- s/p ceftriaxone and doxycycline. The patient was transferred this afternoon to the PCU for symptom management.  - Restraints d/c.  - Symptom management medications ordered ( Dilaudid 0.2mg IV q1hr PRN for moderate pain, Dilaudid 0.3mg IV q1hr PRN for severe pain & dyspnea, Ativan 0.5mg IV q1hr PRN for agitation and PRN Robinul 0.4mg IV q6hr PRN for secretion).  - Will continue to closely monitor.    Patient seen and examined with ANP.  DC restraints.  Will attempt to call son who is vacationing in [facility].      MD Renzo Smith FACP  Available on Teams during regular business hours  Pager after hours [phone]  Division of Geriatrics and Palliative Medicine

## 2023-01-19 NOTE — PROGRESS NOTE ADULT - ASSESSMENT
89 year old male w/pmh dementia , HTN , advanced lung cancer , presents for increased shortness of breath and work of breathing.      Hyoxic resp failure   ? Ppst obstructive pneumonia:   Lung cancer: ? stage   htn:  dementia:     1/18/2023:      Hyoxic resp failure / Hemoptysis:eliquis stopped:  on high flow 75%  ? Ppst obstructive pneumonia:  check all cultures:  cont antibtiocs:   Lung cancer: ? stage  : need to have more information:  dw son : wants him to be comfortable:   htn: controlled  dementia: supportive care: :  kristin acp : reportedly : son wants palliative care: :  cont supportive care    1/19:    Hyoxic resp failure / Hemoptysis: no hemoptysis:  on 60% fio2:   ? Ppst obstructive pneumonia:  check all cultures- bood cultures,  rvp are negative so far:  cont antibiotics  : on zosyn:  would cont for now:   Lung cancer: ? stage  : need to have more information:  dw son : wants him to be comfortable:   htn: controlled  dementia: supportive care: :  kristin acp : reportedly : son wants palliative care: :  cont supportive care : awaiting palliative care transfer:

## 2023-01-19 NOTE — PROGRESS NOTE ADULT - PROBLEM SELECTOR PLAN 1
likely secondary to multifactorial reasons including advanced lung ca and likely superimposed pneumonia   will attempt to wean off HiFlo   continue to follow pulmonary recommendations   no hemoptysis overnight  supportive care

## 2023-01-20 NOTE — PROGRESS NOTE ADULT - SUBJECTIVE AND OBJECTIVE BOX
GAP TEAM PALLIATIVE CARE UNIT PROGRESS NOTE:      [  ] Patient on hospice program.    INDICATION FOR PALLIATIVE CARE UNIT SERVICES/Interval HPI:    OVERNIGHT EVENTS:    DNR on chart: Yes  Yes      Allergies    No Known Allergies    Intolerances    MEDICATIONS  (STANDING):  chlorhexidine 2% Cloths 1 Application(s) Topical daily  dextrose 5%. 1000 milliLiter(s) (50 mL/Hr) IV Continuous <Continuous>  HYDROmorphone  Injectable 0.5 milliGRAM(s) IV Push every 6 hours  labetalol 200 milliGRAM(s) Oral two times a day  piperacillin/tazobactam IVPB.. 3.375 Gram(s) IV Intermittent every 8 hours    MEDICATIONS  (PRN):  acetaminophen     Tablet .. 650 milliGRAM(s) Oral every 6 hours PRN Temp greater or equal to 38C (100.4F), Mild Pain (1 - 3)  bisacodyl Suppository 10 milliGRAM(s) Rectal daily PRN Constipation  glycopyrrolate Injectable 0.4 milliGRAM(s) IV Push every 6 hours PRN secretion  guaiFENesin Oral Liquid (Sugar-Free) 100 milliGRAM(s) Oral every 6 hours PRN Cough  HYDROmorphone  Injectable 0.5 milliGRAM(s) IV Push every 1 hour PRN dyspnea  HYDROmorphone  Injectable 0.5 milliGRAM(s) IV Push every 1 hour PRN Severe Pain (7 - 10)  HYDROmorphone  Injectable 0.3 milliGRAM(s) IV Push every 1 hour PRN Moderate Pain (4 - 6)  LORazepam   Injectable 0.5 milliGRAM(s) IV Push every 1 hour PRN Agitation  melatonin 3 milliGRAM(s) Oral at bedtime PRN Insomnia  ondansetron Injectable 4 milliGRAM(s) IV Push every 8 hours PRN Nausea and/or Vomiting  polyethylene glycol 3350 17 Gram(s) Oral every 12 hours PRN Constipation    ITEMS UNCHECKED ARE NOT PRESENT    PRESENT SYMPTOMS: [ ]Unable to self-report see PAINAD, RDOS below  Source if other than patient:  [ ]Family   [ ]Team     Pain: [ ] yes [ ] no  QOL impact -   Location -                    Aggravating factors -  Quality -  Radiation -  Timing-  Severity (0-10 scale):  Minimal acceptable level (0-10 scale):     Dyspnea:                           [ ]Mild [ ]Moderate [ ]Severe  Anxiety:                             [ ]Mild [ ]Moderate [ ]Severe  Fatigue:                             [ ]Mild [ ]Moderate [ ]Severe  Nausea:                             [ ]Mild [ ]Moderate [ ]Severe  Loss of appetite:              [ ]Mild [ ]Moderate [ ]Severe  Constipation:                    [ ]Mild [ ]Moderate [ ]Severe    PCSSQ [Palliative Care Spiritual Screening Question]   Severity (0-10):  Score of 4 or > indicate consideration of Chaplaincy referral.  Chaplaincy Referral: [ ] yes [ ] refused [ ] following [ ] deferred    Caregiver [place]? : [ ] yes [ ] no [ ] deferred:  Social work referral [ ] Patient & Family Centered Care Referral [ ]     Anticipatory Grief present?:  [ ] yes [ ] no [ ] deferred:  Social work referral [ ] Patient & Family Centered Care Referral [ ]  	  Other Symptoms:  [ ]All other review of systems negative     PHYSICAL EXAM:   Vital Signs Last 24 Hrs  T(C): 36.4 (20 Jan 2023 08:53), Max: 36.8 (19 Jan 2023 15:00)  T(F): 97.5 (20 Jan 2023 08:53), Max: 98.3 (19 Jan 2023 15:00)  HR: 94 (20 Jan 2023 08:53) (81 - 94)  BP: 117/68 (20 Jan 2023 08:53) (117/68 - 161/77)  BP(mean): --  RR: 20 (20 Jan 2023 08:53) (18 - 20)  SpO2: 86% (20 Jan 2023 08:53) (86% - 100%)    Parameters below as of 20 Jan 2023 08:53  Patient On (Oxygen Delivery Method): nasal cannula, high flow     I&O's Summary    19 Jan 2023 07:01  -  20 Jan 2023 07:00  --------------------------------------------------------  IN: 220 mL / OUT: 0 mL / NET: 220 mL      GENERAL: [ ] Cachexia  [ ]Alert  [ ]Oriented x   [ ]Lethargic  [ ]Unarousable  [ ]Verbal  [ ]Non-Verbal  Behavioral:   [ ] Anxiety  [ ] Delirium [ ] Agitation [ ] Other  HEENT:  [ ]Normal   [ ]Dry mouth   [ ]ET Tube/Trach  [ ]Oral lesions  PULMONARY:   [ ]Clear [ ]Tachypnea  [ ]Audible excessive secretions   [ ]Rhonchi        [ ]Right [ ]Left [ ]Bilateral  [ ]Crackles        [ ]Right [ ]Left [ ]Bilateral  [ ]Wheezing     [ ]Right [ ]Left [ ]Bilateral  [ ]Diminished BS [ ]Right [ ]Left [ ]Bilateral    CARDIOVASCULAR:    [ ]Regular [ ]Irregular [ ]Tachy  [ ]Chas [ ]Murmur [ ]Other  GASTROINTESTINAL:  [ ]Soft  [ ]Distended   [ ]+BS  [ ]Non tender [ ]Tender  [ ]Other [ ]PEG [ ]OGT/ NGT   Last BM:    GENITOURINARY:  [ ]Normal [ ] Incontinent   [ ]Oliguria/Anuria   [ ]Lugo  MUSCULOSKELETAL:   [ ]Normal   [ ]Weakness  [ ]Bed/Wheelchair bound [ ]Edema  NEUROLOGIC:   [ ]No focal deficits  [ ] Cognitive impairment  [ ] Dysphagia [ ]Dysarthria [ ] Paresis [ ]Other   SKIN:   [ ]Normal  [ ]Rash  [ ]Other  [ ]Pressure ulcer(s)  [ ]y [ ]n  Present on admission      CRITICAL CARE:  [ ] Shock Present  [ ]Septic [ ]Cardiogenic [ ]Neurologic [ ]Hypovolemic  [ ]  Vasopressors [ ]  Inotropes   [ ] Respiratory failure present [ ] Mechanical Ventilation [ ] Non-invasive ventilatory support [ ] High-Flow  [ ] Acute  [ ] Chronic [ ] Hypoxic  [ ] Hypercarbic [ ] Other  [ ] Other organ failure     LABS:                        8.0    8.57  )-----------( 229      ( 19 Jan 2023 10:40 )             29.1   01-19    149<H>  |  111<H>  |  54<H>  ----------------------------<  119<H>  5.0   |  25  |  1.88<H>    Ca    9.7      19 Jan 2023 10:40          RADIOLOGY & ADDITIONAL STUDIES:    PROTEIN CALORIE MALNUTRITION: [ ] mild [ ] moderate [ ] severe  [ ] underweight [ ] morbid obesity    https://www.andeal.org/vault/6210/web/files/ONC/Table_Clinical%20Characteristics%20to%20Document%20Malnutrition-White%20JV%20et%20al%202012.pdf      Weight (kg): 86.2 (01-17-23 @ 00:49)    [ ] PPSV2 < or = 30% [ ] significant weight loss [ ] poor nutritional intake [ ] anasarca   Artificial Nutrition [ ]     Other REFERRALS:    [ ] Hospice  [ ]Child Life  [ ]Social Work  [ ]Case management [ ]Holistic Therapy [ ] Physical Therapy [ ] Dietary         Palliative Performance Scale:  http://[facility].org/files/news/palliative_performance_scale_ppsv2.pdf  (Ctrl +  left click to view)  Respiratory Distress Observation Tool:  https://homecareinformation.net/handouts/hen/Respiratory_Distress_Observation_Scale.pdf (Ctrl +  left click to view)  PAINAD Score:  http://geriatrictoolkit.[place].[facility]/cog/painad.pdf (Ctrl +  left click to view)   GAP TEAM PALLIATIVE CARE UNIT PROGRESS NOTE:      [  ] Patient on hospice program.    INDICATION FOR PALLIATIVE CARE UNIT SERVICES/Interval HPI: This is a 89 year old male w/PMH dementia , HTN , advanced lung cancer , presents for increased shortness of breath and work of breathing. Patient was transferred to PCU for EOL symptom management.     OVERNIGHT EVENTS:     DNR on chart: Yes  Yes      Allergies    No Known Allergies    Intolerances    MEDICATIONS  (STANDING):  chlorhexidine 2% Cloths 1 Application(s) Topical daily  dextrose 5%. 1000 milliLiter(s) (50 mL/Hr) IV Continuous <Continuous>  HYDROmorphone  Injectable 0.5 milliGRAM(s) IV Push every 6 hours  labetalol 200 milliGRAM(s) Oral two times a day  piperacillin/tazobactam IVPB.. 3.375 Gram(s) IV Intermittent every 8 hours    MEDICATIONS  (PRN):  acetaminophen     Tablet .. 650 milliGRAM(s) Oral every 6 hours PRN Temp greater or equal to 38C (100.4F), Mild Pain (1 - 3)  bisacodyl Suppository 10 milliGRAM(s) Rectal daily PRN Constipation  glycopyrrolate Injectable 0.4 milliGRAM(s) IV Push every 6 hours PRN secretion  guaiFENesin Oral Liquid (Sugar-Free) 100 milliGRAM(s) Oral every 6 hours PRN Cough  HYDROmorphone  Injectable 0.5 milliGRAM(s) IV Push every 1 hour PRN dyspnea  HYDROmorphone  Injectable 0.5 milliGRAM(s) IV Push every 1 hour PRN Severe Pain (7 - 10)  HYDROmorphone  Injectable 0.3 milliGRAM(s) IV Push every 1 hour PRN Moderate Pain (4 - 6)  LORazepam   Injectable 0.5 milliGRAM(s) IV Push every 1 hour PRN Agitation  melatonin 3 milliGRAM(s) Oral at bedtime PRN Insomnia  ondansetron Injectable 4 milliGRAM(s) IV Push every 8 hours PRN Nausea and/or Vomiting  polyethylene glycol 3350 17 Gram(s) Oral every 12 hours PRN Constipation    ITEMS UNCHECKED ARE NOT PRESENT    PRESENT SYMPTOMS: [ ]Unable to self-report see PAINAD, RDOS below  Source if other than patient:  [ ]Family   [ ]Team     Pain: [ ] yes [ ] no  QOL impact -   Location -                    Aggravating factors -  Quality -  Radiation -  Timing-  Severity (0-10 scale):  Minimal acceptable level (0-10 scale):     Dyspnea:                           [ ]Mild [ ]Moderate [ ]Severe  Anxiety:                             [ ]Mild [ ]Moderate [ ]Severe  Fatigue:                             [ ]Mild [ ]Moderate [ ]Severe  Nausea:                             [ ]Mild [ ]Moderate [ ]Severe  Loss of appetite:              [ ]Mild [ ]Moderate [ ]Severe  Constipation:                    [ ]Mild [ ]Moderate [ ]Severe    PCSSQ [Palliative Care Spiritual Screening Question]   Severity (0-10):  Score of 4 or > indicate consideration of Chaplaincy referral.  Chaplaincy Referral: [ ] yes [ ] refused [ ] following [ ] deferred    Caregiver [place]? : [ ] yes [ ] no [ ] deferred:  Social work referral [ ] Patient & Family Centered Care Referral [ ]     Anticipatory Grief present?:  [ ] yes [ ] no [ ] deferred:  Social work referral [ ] Patient & Family Centered Care Referral [ ]  	  Other Symptoms:  [ ]All other review of systems negative     PHYSICAL EXAM:   Vital Signs Last 24 Hrs  T(C): 36.4 (20 Jan 2023 08:53), Max: 36.8 (19 Jan 2023 15:00)  T(F): 97.5 (20 Jan 2023 08:53), Max: 98.3 (19 Jan 2023 15:00)  HR: 94 (20 Jan 2023 08:53) (81 - 94)  BP: 117/68 (20 Jan 2023 08:53) (117/68 - 161/77)  BP(mean): --  RR: 20 (20 Jan 2023 08:53) (18 - 20)  SpO2: 86% (20 Jan 2023 08:53) (86% - 100%)    Parameters below as of 20 Jan 2023 08:53  Patient On (Oxygen Delivery Method): nasal cannula, high flow     I&O's Summary    19 Jan 2023 07:01  -  20 Jan 2023 07:00  --------------------------------------------------------  IN: 220 mL / OUT: 0 mL / NET: 220 mL      GENERAL: [ ] Cachexia  [ ]Alert  [ ]Oriented x   [ ]Lethargic  [ ]Unarousable  [ ]Verbal  [ ]Non-Verbal  Behavioral:   [ ] Anxiety  [ ] Delirium [ ] Agitation [ ] Other  HEENT:  [ ]Normal   [ ]Dry mouth   [ ]ET Tube/Trach  [ ]Oral lesions  PULMONARY:   [ ]Clear [ ]Tachypnea  [ ]Audible excessive secretions   [ ]Rhonchi        [ ]Right [ ]Left [ ]Bilateral  [ ]Crackles        [ ]Right [ ]Left [ ]Bilateral  [ ]Wheezing     [ ]Right [ ]Left [ ]Bilateral  [ ]Diminished BS [ ]Right [ ]Left [ ]Bilateral    CARDIOVASCULAR:    [ ]Regular [ ]Irregular [ ]Tachy  [ ]Chas [ ]Murmur [ ]Other  GASTROINTESTINAL:  [ ]Soft  [ ]Distended   [ ]+BS  [ ]Non tender [ ]Tender  [ ]Other [ ]PEG [ ]OGT/ NGT   Last BM:    GENITOURINARY:  [ ]Normal [ ] Incontinent   [ ]Oliguria/Anuria   [ ]Lugo  MUSCULOSKELETAL:   [ ]Normal   [ ]Weakness  [ ]Bed/Wheelchair bound [ ]Edema  NEUROLOGIC:   [ ]No focal deficits  [ ] Cognitive impairment  [ ] Dysphagia [ ]Dysarthria [ ] Paresis [ ]Other   SKIN:   [ ]Normal  [ ]Rash  [ ]Other  [ ]Pressure ulcer(s)  [ ]y [ ]n  Present on admission      CRITICAL CARE:  [ ] Shock Present  [ ]Septic [ ]Cardiogenic [ ]Neurologic [ ]Hypovolemic  [ ]  Vasopressors [ ]  Inotropes   [ ] Respiratory failure present [ ] Mechanical Ventilation [ ] Non-invasive ventilatory support [ ] High-Flow  [ ] Acute  [ ] Chronic [ ] Hypoxic  [ ] Hypercarbic [ ] Other  [ ] Other organ failure     LABS:                        8.0    8.57  )-----------( 229      ( 19 Jan 2023 10:40 )             29.1   01-19    149<H>  |  111<H>  |  54<H>  ----------------------------<  119<H>  5.0   |  25  |  1.88<H>    Ca    9.7      19 Jan 2023 10:40          RADIOLOGY & ADDITIONAL STUDIES:    PROTEIN CALORIE MALNUTRITION: [ ] mild [ ] moderate [ ] severe  [ ] underweight [ ] morbid obesity    https://www.andeal.org/vault/0411/web/files/ONC/Table_Clinical%20Characteristics%20to%20Document%20Malnutrition-White%20JV%20et%20al%202012.pdf      Weight (kg): 86.2 (01-17-23 @ 00:49)    [ ] PPSV2 < or = 30% [ ] significant weight loss [ ] poor nutritional intake [ ] anasarca   Artificial Nutrition [ ]     Other REFERRALS:    [ ] Hospice  [ ]Child Life  [ ]Social Work  [ ]Case management [ ]Holistic Therapy [ ] Physical Therapy [ ] Dietary         Palliative Performance Scale:  http://npcrc.org/files/news/palliative_performance_scale_ppsv2.pdf  (Ctrl +  left click to view)  Respiratory Distress Observation Tool:  https://[email or web address].net/handouts/hen/Respiratory_Distress_Observation_Scale.pdf (Ctrl +  left click to view)  PAINAD Score:  http://[email or web address].[email or web address]/cog/painad.pdf (Ctrl +  left click to view)   GAP TEAM PALLIATIVE CARE UNIT PROGRESS NOTE:      [  ] Patient on hospice program.    INDICATION FOR PALLIATIVE CARE UNIT SERVICES/Interval HPI: This is a 89 year old male w/PMH dementia , HTN , advanced lung cancer , presents for increased shortness of breath and work of breathing. Patient was transferred to PCU for EOL symptom management.     OVERNIGHT EVENTS: Pt examined at bedside. Pt on HFNC requiring additional increase in HF. Ovenight patient required additional PRNs for dyspnea. Pt required Dilaudid 0.3 x 4 for dyspnea, Glycopyrrolate 0.4 x2 for oral secretions Lorazepam0.5mg x2.    Patient on IV fluids D5% at 50cc, skin intact, no carvajal in place. Last BP 01/13.   Spoke with son Ilir this morning regarding pt status and GOC. Son refused  or any [facility] referrals. [facility].      DNR on chart: Yes  Yes      Allergies    No Known Allergies    Intolerances    MEDICATIONS  (STANDING):  chlorhexidine 2% Cloths 1 Application(s) Topical daily  dextrose 5%. 1000 milliLiter(s) (50 mL/Hr) IV Continuous <Continuous>  HYDROmorphone  Injectable 0.5 milliGRAM(s) IV Push every 6 hours  labetalol 200 milliGRAM(s) Oral two times a day  piperacillin/tazobactam IVPB.. 3.375 Gram(s) IV Intermittent every 8 hours    MEDICATIONS  (PRN):  acetaminophen     Tablet .. 650 milliGRAM(s) Oral every 6 hours PRN Temp greater or equal to 38C (100.4F), Mild Pain (1 - 3)  bisacodyl Suppository 10 milliGRAM(s) Rectal daily PRN Constipation  glycopyrrolate Injectable 0.4 milliGRAM(s) IV Push every 6 hours PRN secretion  guaiFENesin Oral Liquid (Sugar-Free) 100 milliGRAM(s) Oral every 6 hours PRN Cough  HYDROmorphone  Injectable 0.5 milliGRAM(s) IV Push every 1 hour PRN dyspnea  HYDROmorphone  Injectable 0.5 milliGRAM(s) IV Push every 1 hour PRN Severe Pain (7 - 10)  HYDROmorphone  Injectable 0.3 milliGRAM(s) IV Push every 1 hour PRN Moderate Pain (4 - 6)  LORazepam   Injectable 0.5 milliGRAM(s) IV Push every 1 hour PRN Agitation  melatonin 3 milliGRAM(s) Oral at bedtime PRN Insomnia  ondansetron Injectable 4 milliGRAM(s) IV Push every 8 hours PRN Nausea and/or Vomiting  polyethylene glycol 3350 17 Gram(s) Oral every 12 hours PRN Constipation    ITEMS UNCHECKED ARE NOT PRESENT    PRESENT SYMPTOMS: [x ]Unable to self-report see PAINAD, RDOS below  Source if other than patient:  [ ]Family   [ ]Team     Pain: [ ] yes [ ] no  QOL impact -   Location -                    Aggravating factors -  Quality -  Radiation -  Timing-  Severity (0-10 scale):  Minimal acceptable level (0-10 scale):     Dyspnea:                           [ ]Mild [ ]Moderate [ ]Severe  Anxiety:                             [ ]Mild [ ]Moderate [ ]Severe  Fatigue:                             [ ]Mild [ ]Moderate [ ]Severe  Nausea:                             [ ]Mild [ ]Moderate [ ]Severe  Loss of appetite:              [ ]Mild [ ]Moderate [ ]Severe  Constipation:                    [ ]Mild [ ]Moderate [ ]Severe    PCSSQ [Palliative Care Spiritual Screening Question]   Severity (0-10):  Score of 4 or > indicate consideration of Chaplaincy referral.  Chaplaincy Referral: [ ] yes [x ] refused [ ] following [ ] deferred    Caregiver [place]? : [x ] yes [ ] no [ ] deferred:  Social work referral [ ] Patient & Family Centered Care Referral [ ]     Anticipatory Grief present?:  [x ] yes [ ] no [ ] deferred:  Social work referral [ ] Patient & Family Centered Care Referral [ ]  	  Other Symptoms: Unable to obtain ROS   [ ]All other review of systems negative     PHYSICAL EXAM:   Vital Signs Last 24 Hrs  T(C): 36.4 (20 Jan 2023 08:53), Max: 36.8 (19 Jan 2023 15:00)  T(F): 97.5 (20 Jan 2023 08:53), Max: 98.3 (19 Jan 2023 15:00)  HR: 94 (20 Jan 2023 08:53) (81 - 94)  BP: 117/68 (20 Jan 2023 08:53) (117/68 - 161/77)  BP(mean): --  RR: 20 (20 Jan 2023 08:53) (18 - 20)  SpO2: 86% (20 Jan 2023 08:53) (86% - 100%)    Parameters below as of 20 Jan 2023 08:53  Patient On (Oxygen Delivery Method): nasal cannula, high flow     I&O's Summary    19 Jan 2023 07:01  -  20 Jan 2023 07:00  --------------------------------------------------------  IN: 220 mL / OUT: 0 mL / NET: 220 mL      GENERAL: [ ] Cachexia  [ ]Alert  [ ]Oriented x   [x ]Lethargic  [x ]Unarousable  [ ]Verbal  [x ]Non-Verbal  Behavioral:   [ ] Anxiety  [ ] Delirium [ ] Agitation [ ] Other  HEENT:  [ ]Normal   [ x]Dry mouth   [ ]ET Tube/Trach  [ ]Oral lesions  PULMONARY:   [ ]Clear [x ]Tachypnea  [ ]Audible excessive secretions   [ ]Rhonchi        [ ]Right [ ]Left [ ]Bilateral  [ ]Crackles        [ ]Right [ ]Left [ ]Bilateral  [ ]Wheezing     [ ]Right [ ]Left [ ]Bilateral  [ ]Diminished BS [ ]Right [ ]Left [ ]Bilateral    CARDIOVASCULAR:    [x ]Regular [ ]Irregular [ ]Tachy  [ ]Chas [ ]Murmur [ ]Other  GASTROINTESTINAL:  [x ]Soft  [ ]Distended   [ ]+BS  [ ]Non tender [ ]Tender  [ ]Other [ ]PEG [ ]OGT/ NGT   Last BM:    GENITOURINARY:  [ ]Normal [ ] Incontinent   [ ]Oliguria/Anuria   [ ]Carvajal  MUSCULOSKELETAL:   [ ]Normal   [ ]Weakness  [x ]Bed/Wheelchair bound [ ]Edema  NEUROLOGIC: Unable to assess as patient is unarousable and not responsive to verbal and painful stimulus  [ ]No focal deficits  [ ] Cognitive impairment  [ ] Dysphagia [ ]Dysarthria [ ] Paresis [ ]Other   SKIN:   [x ]Normal  [ ]Rash  [ ]Other  [ ]Pressure ulcer(s)  [ ]y [ ]n  Present on admission      CRITICAL CARE:  [ ] Shock Present  [ ]Septic [ ]Cardiogenic [ ]Neurologic [ ]Hypovolemic  [ ]  Vasopressors [ ]  Inotropes   [x ] Respiratory failure present [ ] Mechanical Ventilation [x ] Non-invasive ventilatory support [ x] High-Flow  [ ] Acute  [ ] Chronic [x ] Hypoxic  [ x] Hypercarbic [ ] Other  [x ] 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 Right middle lobe airspace consolidation likely infectious in   etiology; however, an underlying mass with associated post obstructive   pneumonia cannot be excluded.  2. Left upper lobe pulmonary nodules measuring up to 6 mm.    --- End of Report ---    < end of copied text >    PROTEIN CALORIE MALNUTRITION: [ ] mild [ ] moderate [ ] severe  [ ] underweight [ ] morbid obesity    https://www.andeal.org/vault/2440/web/files/ONC/Table_Clinical%20Characteristics%20to%20Document%20Malnutrition-White%20JV%20et%20al%202012.pdf      Weight (kg): 86.2 (01-17-23 @ 00:49)    [ ] PPSV2 < or = 30% [ ] significant weight loss [ ] poor nutritional intake [ ] anasarca   Artificial Nutrition [ ]     Other REFERRALS:    [ ] Hospice  [ ]Child Life  [ ]Social Work  [ ]Case management [ ]Holistic Therapy [ ] Physical Therapy [ ] Dietary         Palliative Performance Scale:  http://npcrc.org/files/news/palliative_performance_scale_ppsv2.pdf  (Ctrl +  left click to view)  Respiratory Distress Observation Tool:  https://[email or web address].net/handouts/hen/Respiratory_Distress_Observation_Scale.pdf (Ctrl +  left click to view)  PAINAD Score:  http://[email or web address].[facility]/cog/painad.pdf (Ctrl +  left click to view)   GAP TEAM PALLIATIVE CARE UNIT PROGRESS NOTE:      [  ] Patient on hospice program.    INDICATION FOR PALLIATIVE CARE UNIT SERVICES/Interval HPI: This is a 89 year old male w/PMH dementia , HTN , advanced lung cancer , presents for increased shortness of breath and work of breathing. Patient was transferred to PCU for EOL symptom management.     OVERNIGHT EVENTS: Pt examined at bedside. Pt on HFNC requiring additional increase in HF. Ovenight patient required additional PRNs for dyspnea. Pt required Dilaudid 0.3 x 4 for dyspnea, Glycopyrrolate 0.4 x2 for oral secretions Lorazepam0.5mg x2.    Patient on IV fluids D5% at 50cc, skin intact, no carvajal in place. Last BP 01/13.   Spoke with son Ilir this morning regarding pt status and GOC. Son refused  or any [facility] referrals. [facility]      DNR on chart: Yes  Yes      Allergies    No Known Allergies    Intolerances    MEDICATIONS  (STANDING):  chlorhexidine 2% Cloths 1 Application(s) Topical daily  dextrose 5%. 1000 milliLiter(s) (50 mL/Hr) IV Continuous <Continuous>  HYDROmorphone  Injectable 0.5 milliGRAM(s) IV Push every 6 hours  labetalol 200 milliGRAM(s) Oral two times a day  piperacillin/tazobactam IVPB.. 3.375 Gram(s) IV Intermittent every 8 hours    MEDICATIONS  (PRN):  acetaminophen     Tablet .. 650 milliGRAM(s) Oral every 6 hours PRN Temp greater or equal to 38C (100.4F), Mild Pain (1 - 3)  bisacodyl Suppository 10 milliGRAM(s) Rectal daily PRN Constipation  glycopyrrolate Injectable 0.4 milliGRAM(s) IV Push every 6 hours PRN secretion  guaiFENesin Oral Liquid (Sugar-Free) 100 milliGRAM(s) Oral every 6 hours PRN Cough  HYDROmorphone  Injectable 0.5 milliGRAM(s) IV Push every 1 hour PRN dyspnea  HYDROmorphone  Injectable 0.5 milliGRAM(s) IV Push every 1 hour PRN Severe Pain (7 - 10)  HYDROmorphone  Injectable 0.3 milliGRAM(s) IV Push every 1 hour PRN Moderate Pain (4 - 6)  LORazepam   Injectable 0.5 milliGRAM(s) IV Push every 1 hour PRN Agitation  melatonin 3 milliGRAM(s) Oral at bedtime PRN Insomnia  ondansetron Injectable 4 milliGRAM(s) IV Push every 8 hours PRN Nausea and/or Vomiting  polyethylene glycol 3350 17 Gram(s) Oral every 12 hours PRN Constipation    ITEMS UNCHECKED ARE NOT PRESENT    PRESENT SYMPTOMS: [x ]Unable to self-report see PAINAD, RDOS below  Source if other than patient:  [ ]Family   [x ]Team     Pain: [ ] yes [ ] no  QOL impact -   Location -                    Aggravating factors -  Quality -  Radiation -  Timing-  Severity (0-10 scale):  Minimal acceptable level (0-10 scale):     Dyspnea:                           [ ]Mild [ ]Moderate [ ]Severe  Anxiety:                             [ ]Mild [ ]Moderate [ ]Severe  Fatigue:                             [ ]Mild [ ]Moderate [ ]Severe  Nausea:                             [ ]Mild [ ]Moderate [ ]Severe  Loss of appetite:              [ ]Mild [ ]Moderate [ ]Severe  Constipation:                    [ ]Mild [ ]Moderate [ ]Severe    PCSSQ [Palliative Care Spiritual Screening Question]   Severity (0-10):  Score of 4 or > indicate consideration of Chaplaincy referral.  Chaplaincy Referral: [ ] yes [x ] refused [ ] following [ ] deferred    Caregiver [place]? : [x ] yes [ ] no [ ] deferred:  Social work referral x[ ] Patient & Family Centered Care Referral [ ]     Anticipatory Grief present?:  [x ] yes [ ] no [ ] deferred:  Social work referral [x ] Patient & Family Centered Care Referral [ ]  	  Other Symptoms: Unable to obtain ROS   [ ]All other review of systems negative     PHYSICAL EXAM:   Vital Signs Last 24 Hrs  T(C): 36.4 (20 Jan 2023 08:53), Max: 36.8 (19 Jan 2023 15:00)  T(F): 97.5 (20 Jan 2023 08:53), Max: 98.3 (19 Jan 2023 15:00)  HR: 94 (20 Jan 2023 08:53) (81 - 94)  BP: 117/68 (20 Jan 2023 08:53) (117/68 - 161/77)  BP(mean): --  RR: 20 (20 Jan 2023 08:53) (18 - 20)  SpO2: 86% (20 Jan 2023 08:53) (86% - 100%)    Parameters below as of 20 Jan 2023 08:53  Patient On (Oxygen Delivery Method): nasal cannula, high flow     I&O's Summary    19 Jan 2023 07:01  -  20 Jan 2023 07:00  --------------------------------------------------------  IN: 220 mL / OUT: 0 mL / NET: 220 mL      GENERAL: [ ] Cachexia  [ ]Alert  [ ]Oriented x   [x ]Lethargic  [x ]Unarousable  [ ]Verbal  [x ]Non-Verbal  Behavioral:   [ ] Anxiety  [ ] Delirium [ ] Agitation [ ] Other  HEENT:  [x ]Normal   [ x]Dry mouth   [ ]ET Tube/Trach  [ ]Oral lesions  PULMONARY:   [ ]Clear [x ]Tachypnea  [ ]Audible excessive secretions   [ ]Rhonchi        [ ]Right [ ]Left [ ]Bilateral  [ ]Crackles        [ ]Right [ ]Left [ ]Bilateral  [ ]Wheezing     [ ]Right [ ]Left [ ]Bilateral  [ ]Diminished BS [ ]Right [ ]Left [ ]Bilateral    CARDIOVASCULAR:    [x ]Regular [ ]Irregular [ ]Tachy  [ ]Chas [ ]Murmur [ ]Other  GASTROINTESTINAL:  [x ]Soft  [ ]Distended  x [ ]+BS  [ x]Non tender [ ]Tender  [ ]Other [ ]PEG [ ]OGT/ NGT   Last BM:  fecal incontinence 1/20  GENITOURINARY:  [x ]Normal [ ] Incontinent   [ ]Oliguria/Anuria   [ x]Carvajal  MUSCULOSKELETAL:   [ ]Normal   [ ]Weakness  [x ]Bed/Wheelchair bound [ ]Edema  NEUROLOGIC: Unable to assess as patient is unarousable and not responsive to verbal and painful stimulus  [ ]No focal deficits  [ x] Cognitive impairment  [ ] Dysphagia [ ]Dysarthria [ ] Paresis [ ]Other   SKIN:   [x ]Normal  [ ]Rash  [ ]Other  [ ]Pressure ulcer(s)  [ ]y [ ]n  Present on admission      CRITICAL CARE:  [ ] Shock Present  [ ]Septic [ ]Cardiogenic [ ]Neurologic [ ]Hypovolemic  [ ]  Vasopressors [ ]  Inotropes   [x ] Respiratory failure present [ ] Mechanical Ventilation [x ] Non-invasive ventilatory support [ x] High-Flow  [ ] Acute  [ ] Chronic [x ] Hypoxic  [ x] Hypercarbic [ ] Other  [x ] 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 Right middle lobe airspace consolidation likely infectious in   etiology; however, an underlying mass with associated post obstructive   pneumonia cannot be excluded.  2. Left upper lobe pulmonary nodules measuring up to 6 mm.    --- End of Report ---    < end of copied text >    PROTEIN CALORIE MALNUTRITION: [ ] mild [ ] moderate [ ] severe  [ ] underweight [ ] morbid obesity    https://www.andeal.org/vault/2440/web/files/ONC/Table_Clinical%20Characteristics%20to%20Document%20Malnutrition-White%20JV%20et%20al%202012.pdf      Weight (kg): 86.2 (01-17-23 @ 00:49)    [x ] PPSV2 < or = 30% [ ] significant weight loss [x ] poor nutritional intake [ ] anasarca   Artificial Nutrition [ ]     Other REFERRALS:    [ ] Hospice  [ ]Child Life  [x ]Social Work  [ ]Case management [ ]Holistic Therapy [ ] Physical Therapy [ ] Dietary         Palliative Performance Scale:  http://npcrc.org/files/news/palliative_performance_scale_ppsv2.pdf  (Ctrl +  left click to view)  Respiratory Distress Observation Tool:  https://homecareinformation.net/handouts/hen/Respiratory_Distress_Observation_Scale.pdf (Ctrl +  left click to view)  PAINAD Score:  http://geriatrictoolkit.[facility]/cog/painad.pdf (Ctrl +  left click to view)

## 2023-01-20 NOTE — PROGRESS NOTE ADULT - PROBLEM SELECTOR PLAN 6
will resume home meds
will resume home meds
c/w Dilaudid 0.3 q1hr PRN for moderate pain  c/w Dilaudid 0.5 q1hr for severe pain

## 2023-01-20 NOTE — PROGRESS NOTE ADULT - PROBLEM SELECTOR PLAN 7
c/w Dilaudid 0.3 q1hr PRN for moderate pain  c/w Dilaudid 0.5 q1hr for severe pain PPSV score 10%  Spoke with Son Ilir this morning regarding GOC and updated him about patients current status   Son requests comfort measures and is aware of dad's prognosis. Wishes to see his dad before he passes. He is flying tonight from [place].

## 2023-01-20 NOTE — PROVIDER CONTACT NOTE (OTHER) - RECOMMENDATIONS
pt pronounced dead at 17 :25 pm   unable to notified son JESSICA at this time he is plain at tis time . Admitting noticed pt pronounced dead at 17 :25 pm   Son JESSICA JAMES made aware this evening . Declined autopsy

## 2023-01-20 NOTE — PROGRESS NOTE ADULT - PROBLEM SELECTOR PLAN 4
Currently on HFNC-will continue for comfort measures   c/w Dilaudid 0.5mg Q6hr   c/w Dilaudid 0.5mg q1hr PRN for dyspnea Currently on HFNC-will continue for comfort measures   c/w Dilaudid 0.5mg Q6hr   c/w Dilaudid 0.5mg q1hr PRN for dyspnea  bowel regimen while on opioids

## 2023-01-20 NOTE — PROGRESS NOTE ADULT - CONVERSATION DETAILS
GOC discussion with jenifer Hazel who is currently in [place] and is trying to fly back tonight [facility] discussion with son Ilir who is currently in [place] and [facility]. Son is aware of dads prognosis and his prior wishes.  and  were offered but son refused. Son knows that dad does not want to have any aggressive measures. He wishes for comfort and painless passing.   Per son, dad has been aware of his prognosis since november.

## 2023-01-20 NOTE — PROGRESS NOTE ADULT - ASSESSMENT
89 year old male w/pmh dementia , HTN , advanced lung cancer , presents for increased shortness of breath and work of breathing. Patient was transferred to PCU for EOL symptom management.  This is a 89 year old male w/pmh dementia , HTN , advanced lung cancer , presents for increased shortness of breath and work of breathing. Patient was transferred to PCU for EOL symptom management.

## 2023-01-20 NOTE — PROGRESS NOTE ADULT - PARTICIPANTS
"-- DO NOT REPLY / DO NOT REPLY ALL --  -- Message is from the [employer]--    General Patient Message      Reason for Call: Patient called in today for Dr. Sylvia Kocher requesting a call back to confirm whether referral [facility] has been authorized. Thank You. Caller Information       Type Contact Phone    02/21/2020 10:46 AM Phone (Incoming) Douglas Ryan (Self) [phone] (M)          Alternative phone number: None    Turnaround time given to caller: ""This message will be sent to [facility] Provider's name]. The clinical team will fulfill your request as soon as they review your message. \""}    "
Referral approved for [facility]:    [facility] [place], Lisseth Strong, [address]  [address] [address] , [address]   Phone [phone]   Fax [phone]     Pt informed, referral faxed
Family

## 2023-01-20 NOTE — PROGRESS NOTE ADULT - PROBLEM SELECTOR PLAN 2
end stage  palliative care evaluation appreciated
end stage  palliative care evaluation appreciated
End stage lung cancer with recent mass progression

## 2023-01-20 NOTE — DISCHARGE NOTE FOR THE EXPIRED PATIENT - NS PATIENT DEATH CRITERIA
no heart or lungs sounds heard on exam. No pulse present on palpation./Patient is dead based on Cardiopulmonary criteria including absent breath sounds, pulselessness and/or asystole

## 2023-01-20 NOTE — PROGRESS NOTE ADULT - PROBLEM SELECTOR PLAN 8
PPSV score 10%  Spoke with Son Ilir this morning regarding GOC and updated him about patients current status   Son requests comfort measures and is aware of dad's prognosis. Wishes to see his dad before he passes. He is flying tonight from [place].

## 2023-01-20 NOTE — PROGRESS NOTE ADULT - PROBLEM SELECTOR PLAN 5
DNR/DNI with comfort measures   Spoke with son Ilir, this morning and discussed GOC  Son is flying from [place] [employer] and hopes to be able to see his father alive.   He wants comfort measures for his dad
PAS for DVT prophylaxis
discontinue enoxaparin   PAS for DVT prophylaxis

## 2023-01-20 NOTE — PROGRESS NOTE ADULT - PROBLEM SELECTOR PLAN 1
secondary to malignancy and post obstructive pneumonia  Currently on HFNC   Patient persistently dyspneic secondary to malignancy and possible post obstructive pneumonia, completed course antibiotics  Currently on HFNC   Patient persistently dyspneic

## 2023-01-20 NOTE — PROGRESS NOTE ADULT - PROBLEM SELECTOR PLAN 3
acceptable  continue labetalol
resume antihypertensives  BP in 150 + range
End stage  Fully dependent with his ADLS

## 2023-01-20 NOTE — DISCHARGE NOTE FOR THE EXPIRED PATIENT - NAME OF PERSON WHO MADE CONTACTED FAMILY
UNABLE TO NOTIFED SON JESSICA - he is in plane at this time as per DR Laureen THIBODEAUX Kenisha Diaz RN

## 2023-01-20 NOTE — PROGRESS NOTE ADULT - PROBLEM SELECTOR PROBLEM 3
HTN (hypertension)
Dementia

## 2023-01-20 NOTE — DISCHARGE NOTE FOR THE EXPIRED PATIENT - HOSPITAL COURSE
89 year old male with dementia and progressive lung cancer was  brought into hospital for sob and increased work of breathing. In ED, acute hypoxemic respiratory failure was noted. Pt was admitted to medicine and treated for possible post obstructive PNA.  Discussion with son Ilir by ED staff determined that patient DNR/DNI  and comfort measures only so patient transferred to PCU.. Palliative team  able to reach son today, confirmed  the goals for comfort care only. Son stated that pt has been sick at home in debilitated state requiring  24 hour care.  Pt is aware that pt's condition is poor.  Son at the time of call was in [place], trying to catch plane to [place] and  he was hopeful that he will get to visit with his dad before he passes. Unfortunately pt continued to deteriorate in PCU and on 1/20/2023 at 5:25pm pt was pronounced. Staff notified the son and team provided support.

## 2023-01-20 NOTE — PROGRESS NOTE ADULT - PROBLEM SELECTOR PROBLEM 5
Need for prophylactic measure
Advance care planning
Need for prophylactic measure
Need for prophylactic measure

## 2023-01-20 NOTE — PROVIDER CONTACT NOTE (OTHER) - ASSESSMENT
A&Ox4, VSS
No response to external stimuli  No spontaneous respirations  No apical heart rate  Negative papillary response to light

## 2023-01-20 NOTE — PROGRESS NOTE ADULT - SUBJECTIVE AND OBJECTIVE BOX
Date of Service: 01-20-23 @ 13:13    Patient is a 89y old  Male who presents with a chief complaint of SOB x1 day (20 Jan 2023 11:17)      Any change in ROS: pt is in PCU now:  doing very poory:  on  high flow  : not responsive : slightly restless;       MEDICATIONS  (STANDING):  chlorhexidine 2% Cloths 1 Application(s) Topical daily  HYDROmorphone  Injectable 0.5 milliGRAM(s) IV Push every 6 hours  labetalol 200 milliGRAM(s) Oral two times a day  piperacillin/tazobactam IVPB.. 3.375 Gram(s) IV Intermittent every 8 hours  sodium chloride 0.9%. 1000 milliLiter(s) (10 mL/Hr) IV Continuous <Continuous>    MEDICATIONS  (PRN):  acetaminophen  Suppository .. 650 milliGRAM(s) Rectal every 6 hours PRN Temp greater or equal to 38C (100.4F), Mild Pain (1 - 3)  bisacodyl Suppository 10 milliGRAM(s) Rectal daily PRN Constipation  glycopyrrolate Injectable 0.4 milliGRAM(s) IV Push every 6 hours PRN secretion  guaiFENesin Oral Liquid (Sugar-Free) 100 milliGRAM(s) Oral every 6 hours PRN Cough  HYDROmorphone  Injectable 0.5 milliGRAM(s) IV Push every 1 hour PRN dyspnea  HYDROmorphone  Injectable 0.5 milliGRAM(s) IV Push every 1 hour PRN Severe Pain (7 - 10)  HYDROmorphone  Injectable 0.3 milliGRAM(s) IV Push every 1 hour PRN Moderate Pain (4 - 6)  LORazepam   Injectable 0.5 milliGRAM(s) IV Push every 1 hour PRN Agitation  melatonin 3 milliGRAM(s) Oral at bedtime PRN Insomnia  ondansetron Injectable 4 milliGRAM(s) IV Push every 8 hours PRN Nausea and/or Vomiting    Vital Signs Last 24 Hrs  T(C): 36.4 (20 Jan 2023 08:53), Max: 36.8 (19 Jan 2023 15:00)  T(F): 97.5 (20 Jan 2023 08:53), Max: 98.3 (19 Jan 2023 15:00)  HR: 94 (20 Jan 2023 08:53) (81 - 94)  BP: 117/68 (20 Jan 2023 08:53) (117/68 - 161/77)  BP(mean): --  RR: 20 (20 Jan 2023 08:53) (18 - 20)  SpO2: 86% (20 Jan 2023 08:53) (86% - 100%)    Parameters below as of 20 Jan 2023 08:53  Patient On (Oxygen Delivery Method): nasal cannula, high flow        I&O's Summary    19 Jan 2023 07:01  -  20 Jan 2023 07:00  --------------------------------------------------------  IN: 220 mL / OUT: 0 mL / NET: 220 mL          Physical Exam:   GENERAL: NAD, well-groomed, well-developed  HEENT: CORKY/   Atraumatic, Normocephalic  ENMT: No tonsillar erythema, exudates, or enlargement; Moist mucous membranes, Good dentition, No lesions  NECK: Supple, No JVD, Normal thyroid  CHEST/LUNG: poor air entry   CVS: Regular rate and rhythm; No murmurs, rubs, or gallops  GI: : Soft, Nontender, Nondistended; Bowel sounds present  NERVOUS SYSTEM:  on high flow  : letahrgic  EXTREMITIES:  - edema  LYMPH: No lymphadenopathy noted  SKIN: No rashes or lesions  ENDOCRINOLOGY: No Thyromegaly  PSYCH: calm   Labs:  28                            8.0    8.57  )-----------( 229      ( 19 Jan 2023 10:40 )             29.1                         8.7    9.36  )-----------( 288      ( 18 Jan 2023 07:09 )             31.1                         8.2    7.47  )-----------( 247      ( 17 Jan 2023 05:13 )             29.1                         8.7    7.37  )-----------( 279      ( 17 Jan 2023 01:16 )             30.7     01-19    149<H>  |  111<H>  |  54<H>  ----------------------------<  119<H>  5.0   |  25  |  1.88<H>  01-18    147<H>  |  108  |  46<H>  ----------------------------<  70  5.0   |  21<L>  |  1.76<H>  01-17    145  |  109<H>  |  38<H>  ----------------------------<  125<H>  5.1   |  24  |  1.59<H>  01-17    143  |  107  |  38<H>  ----------------------------<  130<H>  5.4<H>   |  24  |  1.63<H>    Ca    9.7      19 Jan 2023 10:40    TPro  6.3  /  Alb  2.9<L>  /  TBili  0.2  /  DBili  x   /  AST  12  /  ALT  8<L>  /  AlkPhos  48  01-18  TPro  6.1  /  Alb  2.9<L>  /  TBili  0.2  /  DBili  x   /  AST  9<L>  /  ALT  8<L>  /  AlkPhos  48  01-17  TPro  6.6  /  Alb  3.3  /  TBili  0.2  /  DBili  x   /  AST  9<L>  /  ALT  9<L>  /  AlkPhos  54  01-17    CAPILLARY BLOOD GLUCOSE                Procalcitonin, Serum: 0.14 ng/mL (01-17 @ 01:16)        RECENT CULTURES:  01-17 @ 04:39 Clean Catch Clean Catch (Midstream)                No growth    01-17 @ 01:12 .Blood Blood-Peripheral            rad< from: CT Chest No Cont (01.17.23 @ 02:59) >    FINDINGS:    LUNGS AND AIRWAYS: Patent central airways. Right middle lobe airspace   consolidation measuring 8.0 x 6.9 x 5.7 cm. Adjacent patchy airspace   opacities in the right middle lobe. Right lower lobe and lingular   subsegmental atelectasis. Left upper lobe pulmonary nodules measuring up   to 6 mm (series 3, image 197 and 219) and 4 mm (series 3, image 162).   Intrafissural lymph nodes in the left major fissure. Centrilobular   emphysema most prominent in the upper lobes. Right upper lobe calcified   granuloma.  PLEURA: No pleural effusion. Small amount of fluidin the right major   fissure.  MEDIASTINUM AND SHARON: Enlarged right lower paratracheal duct measuring   1.2 x 1.0 cm. Enlarged subcarinal lymph node measuring 1.7 x 1.4 cm.  VESSELS: Calcified plaque in the aortic arch and coronary arteries.  HEART: Heart size is enlarged. No pericardial effusion.  CHEST WALL AND LOWER NECK: Mild bilateral gynecomastia.  VISUALIZED UPPER ABDOMEN: Left renal cyst. Nonspecific left perinephric   fat stranding.  BONES: Degenerative changes of the spine. Old posterior left 10th and   11th rib fractures.    IMPRESSION:    1. Right middle lobe airspace consolidation likely infectious in   etiology; however, an underlying mass with associated post obstructive   pneumonia cannot be excluded.  2. Left upper lobe pulmonary nodules measuring up to 6 mm.    --- End of Report ---           ANGELICA AHMADI MD; Resident Radiologist  This document has been electronically signed.  MISA PAREDES MD; Attending Radiologist  This document has been electronically signed. Jan 17 2023  5:13AM    < end of copied text >  < from: Xray Chest 1 View- PORTABLE-Urgent (Xray Chest 1 View- PORTABLE-Urgent .) (01.17.23 @ 01:17) >        INTERPRETATION:  CLINICAL INDICATION:  Respiratory distress. Evaluate for   pleural effusion    COMPARISON: Chest radiograph dated 11/29/2012    TECHNIQUE: Frontal radiograph of the chest.    FINDINGS:    Cardiac/mediastinum/hilum: Heart size cannot be accurately assessed on   this projection.    Lung parenchyma/Pleura: Small right pleural effusion. Right mid and lower   lung hazy and patchy opacification. There is no pneumothorax.    Skeleton/soft tissues: No acute osseous abnormalities.    IMPRESSION:    Small right pleural effusion with adjacent atelectasis. Superimposed   infection cannot be excluded.    --- End of Report ---           ANGELICA AHMADI MD; Resident Radiologist  This document has been electronically signed.  TORSTEN MIRZA MD; Attending Radiologist  This document has been electronically signed. Jan 17 2023  8:29AM    < end of copied text >      No growth to date.    01-17 @ 01:10 .Blood Blood-Peripheral                No growth to date.          RESPIRATORY CULTURES:          Studies  Chest X-RAY  CT SCAN Chest   Venous Dopplers: LE:   CT Abdomen  Others

## 2023-01-20 NOTE — PROGRESS NOTE ADULT - ASSESSMENT
89 year old male w/pmh dementia , HTN , advanced lung cancer , presents for increased shortness of breath and work of breathing.      Hyoxic resp failure   ? Ppst obstructive pneumonia:   Lung cancer: ? stage   htn:  dementia:     1/18/2023:      Hyoxic resp failure / Hemoptysis:eliquis stopped:  on high flow 75%  ? Ppst obstructive pneumonia:  check all cultures:  cont antibtiocs:   Lung cancer: ? stage  : need to have more information:  dw son : wants him to be comfortable:   htn: controlled  dementia: supportive care: :  kristin acp : reportedly : son wants palliative care: :  cont supportive care    1/19:    Hyoxic resp failure / Hemoptysis: no hemoptysis:  on 60% fio2:   ? Ppst obstructive pneumonia:  check all cultures- bood cultures,  rvp are negative so far:  cont antibiotics  : on zosyn:  would cont for now:   Lung cancer: ? stage  : need to have more information:  dw son : wants him to be comfortable:   htn: controlled  dementia: supportive care: :  kristin acp : reportedly : son wants palliative care: :  cont supportive care : awaiting palliative care transfer:      1/20:    Hyoxic resp failure / Hemoptysis: no hemoptysis:  on 50% fio2: : cont to do poorly:    ? Ppst obstructive pneumonia:  check all cultures- blood cultures,  rvp are negative so far:  cont antibiotics  : on zosyn:  would cont for now:  ? now pt is fully palliative care: ? dc antibiotics  Lung cancer: ? stage  :  dw son : wants him to be comfortable:   htn: controlled  dementia: supportive care: :  cont supportive care ;  pt is in PCU unit town : the goals have changed  will sign off:

## 2023-01-20 NOTE — PROGRESS NOTE ADULT - ATTENDING COMMENTS
89 year old male with progressive lung cancer by hx, cared for at [facility] for this, at home with 24 hour care, brought into hospital for acute hypoxemic respiratory failure.  Admitted to medicine and treated for possible post obstructive PNA.  Discussion with jenifer Hazel by ED staff determined that patient DNR/DNI - comfort measures only so patient transferred to PCU.  He arrived in restraints.  We were able to reach son today, confirm the goals for comfort care only, and son will arrive home from [place] by 8:30ish tonight.  He is hopeful that he will get to visit with his dad before he passes.    In the setting of parenteral controlled substance administration, clinical monitoring required for side effects such as respiratory depression, constipation and opioid induced neurotoxicity.  This patient is at [x ]high [ ] medium [ ] low risk due to respiratory failure    [x ] The patient is receiving IV and has required  escalation for uncontrolled symptoms.  [ ] To taper and monitor for emergence of symptoms.  [ ] Symptoms controlled with present regimen.     Hospice transition to be addressed if clinical condition permits. Patient assessment and plan discussed on interdisciplinary team rounds today.

## 2023-01-22 LAB
CULTURE RESULTS: SIGNIFICANT CHANGE UP
CULTURE RESULTS: SIGNIFICANT CHANGE UP
SPECIMEN SOURCE: SIGNIFICANT CHANGE UP
SPECIMEN SOURCE: SIGNIFICANT CHANGE UP

## 2024-07-02 NOTE — PROGRESS NOTE ADULT - PROVIDER SPECIALTY LIST ADULT
Methotrexate Rx sent  
Patient called requesting a refill of his methotrexate. Patient stated he doesn't have any refills left at the pharmacy, and he only has next Monday's dose then he will be out. Patient stated he had labs drawn last Friday at [facility] (I will request those get faxed to us). Last labs are from 4/3/24 in [facility], Per chart note, labs every 12 weeks.   
Internal Medicine
Pulmonology
Pulmonology
Internal Medicine
Palliative Care
Pulmonology
Internal Medicine